# Patient Record
Sex: MALE | Race: WHITE | NOT HISPANIC OR LATINO | Employment: OTHER | URBAN - METROPOLITAN AREA
[De-identification: names, ages, dates, MRNs, and addresses within clinical notes are randomized per-mention and may not be internally consistent; named-entity substitution may affect disease eponyms.]

---

## 2018-02-26 ENCOUNTER — OFFICE VISIT CONVERTED (OUTPATIENT)
Dept: FAMILY MEDICINE CLINIC | Facility: CLINIC | Age: 68
End: 2018-02-26
Attending: NURSE PRACTITIONER

## 2018-06-13 ENCOUNTER — OFFICE VISIT CONVERTED (OUTPATIENT)
Dept: FAMILY MEDICINE CLINIC | Facility: CLINIC | Age: 68
End: 2018-06-13
Attending: NURSE PRACTITIONER

## 2018-09-12 ENCOUNTER — OFFICE VISIT CONVERTED (OUTPATIENT)
Dept: FAMILY MEDICINE CLINIC | Facility: CLINIC | Age: 68
End: 2018-09-12
Attending: NURSE PRACTITIONER

## 2018-09-12 ENCOUNTER — CONVERSION ENCOUNTER (OUTPATIENT)
Dept: FAMILY MEDICINE CLINIC | Facility: CLINIC | Age: 68
End: 2018-09-12

## 2019-03-11 ENCOUNTER — CONVERSION ENCOUNTER (OUTPATIENT)
Dept: FAMILY MEDICINE CLINIC | Facility: CLINIC | Age: 69
End: 2019-03-11

## 2019-03-11 ENCOUNTER — OFFICE VISIT CONVERTED (OUTPATIENT)
Dept: FAMILY MEDICINE CLINIC | Facility: CLINIC | Age: 69
End: 2019-03-11
Attending: NURSE PRACTITIONER

## 2019-03-21 ENCOUNTER — HOSPITAL ENCOUNTER (OUTPATIENT)
Dept: GENERAL RADIOLOGY | Facility: HOSPITAL | Age: 69
Discharge: HOME | End: 2019-03-21
Attending: NURSE PRACTITIONER

## 2019-09-18 ENCOUNTER — HOSPITAL ENCOUNTER (OUTPATIENT)
Dept: GENERAL RADIOLOGY | Facility: HOSPITAL | Age: 69
Discharge: HOME | End: 2019-09-18
Attending: NURSE PRACTITIONER

## 2019-09-18 ENCOUNTER — CONVERSION ENCOUNTER (OUTPATIENT)
Dept: FAMILY MEDICINE CLINIC | Facility: CLINIC | Age: 69
End: 2019-09-18

## 2019-09-18 ENCOUNTER — HOSPITAL ENCOUNTER (OUTPATIENT)
Dept: LAB | Facility: HOSPITAL | Age: 69
Discharge: HOME | End: 2019-09-18
Attending: NURSE PRACTITIONER

## 2019-09-18 ENCOUNTER — OFFICE VISIT CONVERTED (OUTPATIENT)
Dept: FAMILY MEDICINE CLINIC | Facility: CLINIC | Age: 69
End: 2019-09-18
Attending: NURSE PRACTITIONER

## 2019-09-18 LAB
ALBUMIN SERPL-MCNC: 4.5 G/DL (ref 3.5–5)
ALBUMIN/GLOB SERPL: 1.7 {RATIO} (ref 1.4–2.6)
ALP SERPL-CCNC: 56 U/L (ref 56–155)
ALT SERPL-CCNC: 14 U/L (ref 10–40)
ANION GAP SERPL CALC-SCNC: 26 MMOL/L (ref 8–19)
AST SERPL-CCNC: 17 U/L (ref 15–50)
BASOPHILS # BLD AUTO: 0.06 10*3/UL (ref 0–0.2)
BASOPHILS NFR BLD AUTO: 0.7 % (ref 0–3)
BILIRUB SERPL-MCNC: 0.18 MG/DL (ref 0.2–1.3)
BUN SERPL-MCNC: 18 MG/DL (ref 5–25)
BUN/CREAT SERPL: 13 {RATIO} (ref 6–20)
CALCIUM SERPL-MCNC: 10.3 MG/DL (ref 8.7–10.4)
CHLORIDE SERPL-SCNC: 101 MMOL/L (ref 99–111)
CHOLEST SERPL-MCNC: 136 MG/DL (ref 107–200)
CHOLEST/HDLC SERPL: 2.8 {RATIO} (ref 3–6)
CONV ABS IMM GRAN: 0.02 10*3/UL (ref 0–0.2)
CONV CO2: 23 MMOL/L (ref 22–32)
CONV IMMATURE GRAN: 0.2 % (ref 0–1.8)
CONV TOTAL PROTEIN: 7.1 G/DL (ref 6.3–8.2)
CREAT UR-MCNC: 1.43 MG/DL (ref 0.7–1.2)
CRP SERPL-MCNC: 1.6 MG/L (ref 0–5)
DEPRECATED RDW RBC AUTO: 44.8 FL (ref 35.1–43.9)
EOSINOPHIL # BLD AUTO: 0.41 10*3/UL (ref 0–0.7)
EOSINOPHIL # BLD AUTO: 5.1 % (ref 0–7)
ERYTHROCYTE [DISTWIDTH] IN BLOOD BY AUTOMATED COUNT: 12.5 % (ref 11.6–14.4)
ERYTHROCYTE [SEDIMENTATION RATE] IN BLOOD: 18 MM/H (ref 0–20)
GFR SERPLBLD BASED ON 1.73 SQ M-ARVRAT: 50 ML/MIN/{1.73_M2}
GLOBULIN UR ELPH-MCNC: 2.6 G/DL (ref 2–3.5)
GLUCOSE SERPL-MCNC: 90 MG/DL (ref 70–99)
HCT VFR BLD AUTO: 36.2 % (ref 42–52)
HDLC SERPL-MCNC: 48 MG/DL (ref 40–60)
HGB BLD-MCNC: 11.4 G/DL (ref 14–18)
LDLC SERPL CALC-MCNC: 62 MG/DL (ref 70–100)
LYMPHOCYTES # BLD AUTO: 2.35 10*3/UL (ref 1–5)
LYMPHOCYTES NFR BLD AUTO: 29.3 % (ref 20–45)
MCH RBC QN AUTO: 30.9 PG (ref 27–31)
MCHC RBC AUTO-ENTMCNC: 31.5 G/DL (ref 33–37)
MCV RBC AUTO: 98.1 FL (ref 80–96)
MONOCYTES # BLD AUTO: 0.6 10*3/UL (ref 0.2–1.2)
MONOCYTES NFR BLD AUTO: 7.5 % (ref 3–10)
NEUTROPHILS # BLD AUTO: 4.57 10*3/UL (ref 2–8)
NEUTROPHILS NFR BLD AUTO: 57.2 % (ref 30–85)
NRBC CBCN: 0 % (ref 0–0.7)
OSMOLALITY SERPL CALC.SUM OF ELEC: 301 MOSM/KG (ref 273–304)
PLATELET # BLD AUTO: 319 10*3/UL (ref 130–400)
PMV BLD AUTO: 10.2 FL (ref 9.4–12.4)
POTASSIUM SERPL-SCNC: 4.8 MMOL/L (ref 3.5–5.3)
PSA SERPL-MCNC: 1.15 NG/ML (ref 0–4)
RBC # BLD AUTO: 3.69 10*6/UL (ref 4.7–6.1)
SODIUM SERPL-SCNC: 145 MMOL/L (ref 135–147)
TRIGL SERPL-MCNC: 132 MG/DL (ref 40–150)
TSH SERPL-ACNC: 1.59 M[IU]/L (ref 0.27–4.2)
URATE SERPL-MCNC: 6 MG/DL (ref 3.5–8.5)
VLDLC SERPL-MCNC: 26 MG/DL (ref 5–37)
WBC # BLD AUTO: 8.01 10*3/UL (ref 4.8–10.8)

## 2020-03-18 ENCOUNTER — OFFICE VISIT CONVERTED (OUTPATIENT)
Dept: FAMILY MEDICINE CLINIC | Facility: CLINIC | Age: 70
End: 2020-03-18
Attending: NURSE PRACTITIONER

## 2020-03-18 ENCOUNTER — HOSPITAL ENCOUNTER (OUTPATIENT)
Dept: LAB | Facility: HOSPITAL | Age: 70
Discharge: HOME | End: 2020-03-18
Attending: NURSE PRACTITIONER

## 2020-03-18 LAB
ALBUMIN SERPL-MCNC: 4.5 G/DL (ref 3.5–5)
ALBUMIN/GLOB SERPL: 1.7 {RATIO} (ref 1.4–2.6)
ALP SERPL-CCNC: 61 U/L (ref 56–155)
ALT SERPL-CCNC: 16 U/L (ref 10–40)
ANION GAP SERPL CALC-SCNC: 18 MMOL/L (ref 8–19)
APPEARANCE UR: CLEAR
AST SERPL-CCNC: 19 U/L (ref 15–50)
BASOPHILS # BLD AUTO: 0.08 10*3/UL (ref 0–0.2)
BASOPHILS NFR BLD AUTO: 1.1 % (ref 0–3)
BILIRUB SERPL-MCNC: 0.16 MG/DL (ref 0.2–1.3)
BILIRUB UR QL: NEGATIVE
BUN SERPL-MCNC: 20 MG/DL (ref 5–25)
BUN/CREAT SERPL: 14 {RATIO} (ref 6–20)
CALCIUM SERPL-MCNC: 9.7 MG/DL (ref 8.7–10.4)
CHLORIDE SERPL-SCNC: 98 MMOL/L (ref 99–111)
CHOLEST SERPL-MCNC: 159 MG/DL (ref 107–200)
CHOLEST/HDLC SERPL: 2.8 {RATIO} (ref 3–6)
COLOR UR: YELLOW
CONV ABS IMM GRAN: 0.02 10*3/UL (ref 0–0.2)
CONV CO2: 25 MMOL/L (ref 22–32)
CONV COLLECTION SOURCE (UA): NORMAL
CONV IMMATURE GRAN: 0.3 % (ref 0–1.8)
CONV TOTAL PROTEIN: 7.2 G/DL (ref 6.3–8.2)
CONV UROBILINOGEN IN URINE BY AUTOMATED TEST STRIP: 0.2 {EHRLICHU}/DL (ref 0.1–1)
CREAT UR-MCNC: 1.48 MG/DL (ref 0.7–1.2)
DEPRECATED RDW RBC AUTO: 43.8 FL (ref 35.1–43.9)
EOSINOPHIL # BLD AUTO: 0.35 10*3/UL (ref 0–0.7)
EOSINOPHIL # BLD AUTO: 4.8 % (ref 0–7)
ERYTHROCYTE [DISTWIDTH] IN BLOOD BY AUTOMATED COUNT: 12.6 % (ref 11.6–14.4)
GFR SERPLBLD BASED ON 1.73 SQ M-ARVRAT: 48 ML/MIN/{1.73_M2}
GLOBULIN UR ELPH-MCNC: 2.7 G/DL (ref 2–3.5)
GLUCOSE SERPL-MCNC: 93 MG/DL (ref 70–99)
GLUCOSE UR QL: NEGATIVE MG/DL
HCT VFR BLD AUTO: 36.9 % (ref 42–52)
HDLC SERPL-MCNC: 56 MG/DL (ref 40–60)
HGB BLD-MCNC: 11.9 G/DL (ref 14–18)
HGB UR QL STRIP: NEGATIVE
KETONES UR QL STRIP: NEGATIVE MG/DL
LDLC SERPL CALC-MCNC: 81 MG/DL (ref 70–100)
LEUKOCYTE ESTERASE UR QL STRIP: NEGATIVE
LYMPHOCYTES # BLD AUTO: 2.32 10*3/UL (ref 1–5)
LYMPHOCYTES NFR BLD AUTO: 31.7 % (ref 20–45)
MCH RBC QN AUTO: 30.8 PG (ref 27–31)
MCHC RBC AUTO-ENTMCNC: 32.2 G/DL (ref 33–37)
MCV RBC AUTO: 95.6 FL (ref 80–96)
MONOCYTES # BLD AUTO: 0.78 10*3/UL (ref 0.2–1.2)
MONOCYTES NFR BLD AUTO: 10.7 % (ref 3–10)
NEUTROPHILS # BLD AUTO: 3.76 10*3/UL (ref 2–8)
NEUTROPHILS NFR BLD AUTO: 51.4 % (ref 30–85)
NITRITE UR QL STRIP: NEGATIVE
NRBC CBCN: 0 % (ref 0–0.7)
OSMOLALITY SERPL CALC.SUM OF ELEC: 284 MOSM/KG (ref 273–304)
PH UR STRIP.AUTO: 5 [PH] (ref 5–8)
PLATELET # BLD AUTO: 306 10*3/UL (ref 130–400)
PMV BLD AUTO: 10.1 FL (ref 9.4–12.4)
POTASSIUM SERPL-SCNC: 4.7 MMOL/L (ref 3.5–5.3)
PROT UR QL: NEGATIVE MG/DL
RBC # BLD AUTO: 3.86 10*6/UL (ref 4.7–6.1)
SODIUM SERPL-SCNC: 136 MMOL/L (ref 135–147)
SP GR UR: 1.02 (ref 1–1.03)
TRIGL SERPL-MCNC: 108 MG/DL (ref 40–150)
VLDLC SERPL-MCNC: 22 MG/DL (ref 5–37)
WBC # BLD AUTO: 7.31 10*3/UL (ref 4.8–10.8)

## 2020-06-22 ENCOUNTER — OFFICE VISIT CONVERTED (OUTPATIENT)
Dept: FAMILY MEDICINE CLINIC | Facility: CLINIC | Age: 70
End: 2020-06-22
Attending: NURSE PRACTITIONER

## 2020-09-23 ENCOUNTER — OFFICE VISIT CONVERTED (OUTPATIENT)
Dept: FAMILY MEDICINE CLINIC | Facility: CLINIC | Age: 70
End: 2020-09-23
Attending: NURSE PRACTITIONER

## 2020-09-23 ENCOUNTER — HOSPITAL ENCOUNTER (OUTPATIENT)
Dept: LAB | Facility: HOSPITAL | Age: 70
Discharge: HOME | End: 2020-09-23
Attending: NURSE PRACTITIONER

## 2020-09-23 ENCOUNTER — CONVERSION ENCOUNTER (OUTPATIENT)
Dept: FAMILY MEDICINE CLINIC | Facility: CLINIC | Age: 70
End: 2020-09-23

## 2020-09-23 LAB
ALBUMIN SERPL-MCNC: 4.2 G/DL (ref 3.5–5)
ALBUMIN/GLOB SERPL: 1.5 {RATIO} (ref 1.4–2.6)
ALP SERPL-CCNC: 57 U/L (ref 56–155)
ALT SERPL-CCNC: 20 U/L (ref 10–40)
ANION GAP SERPL CALC-SCNC: 19 MMOL/L (ref 8–19)
APPEARANCE UR: CLEAR
AST SERPL-CCNC: 23 U/L (ref 15–50)
BASOPHILS # BLD AUTO: 0.06 10*3/UL (ref 0–0.2)
BASOPHILS NFR BLD AUTO: 0.8 % (ref 0–3)
BILIRUB SERPL-MCNC: 0.21 MG/DL (ref 0.2–1.3)
BILIRUB UR QL: NEGATIVE
BUN SERPL-MCNC: 13 MG/DL (ref 5–25)
BUN/CREAT SERPL: 10 {RATIO} (ref 6–20)
CALCIUM SERPL-MCNC: 9.4 MG/DL (ref 8.7–10.4)
CHLORIDE SERPL-SCNC: 99 MMOL/L (ref 99–111)
CHOLEST SERPL-MCNC: 155 MG/DL (ref 107–200)
CHOLEST/HDLC SERPL: 3.2 {RATIO} (ref 3–6)
COLOR UR: YELLOW
CONV ABS IMM GRAN: 0.01 10*3/UL (ref 0–0.2)
CONV CO2: 27 MMOL/L (ref 22–32)
CONV COLLECTION SOURCE (UA): NORMAL
CONV IMMATURE GRAN: 0.1 % (ref 0–1.8)
CONV TOTAL PROTEIN: 7 G/DL (ref 6.3–8.2)
CONV UROBILINOGEN IN URINE BY AUTOMATED TEST STRIP: 0.2 {EHRLICHU}/DL (ref 0.1–1)
CREAT UR-MCNC: 1.25 MG/DL (ref 0.7–1.2)
DEPRECATED RDW RBC AUTO: 46.5 FL (ref 35.1–43.9)
EOSINOPHIL # BLD AUTO: 0.42 10*3/UL (ref 0–0.7)
EOSINOPHIL # BLD AUTO: 5.7 % (ref 0–7)
ERYTHROCYTE [DISTWIDTH] IN BLOOD BY AUTOMATED COUNT: 12.8 % (ref 11.6–14.4)
GFR SERPLBLD BASED ON 1.73 SQ M-ARVRAT: 58 ML/MIN/{1.73_M2}
GLOBULIN UR ELPH-MCNC: 2.8 G/DL (ref 2–3.5)
GLUCOSE SERPL-MCNC: 85 MG/DL (ref 70–99)
GLUCOSE UR QL: NEGATIVE MG/DL
HCT VFR BLD AUTO: 40.6 % (ref 42–52)
HDLC SERPL-MCNC: 48 MG/DL (ref 40–60)
HGB BLD-MCNC: 12.7 G/DL (ref 14–18)
HGB UR QL STRIP: NEGATIVE
KETONES UR QL STRIP: NEGATIVE MG/DL
LDLC SERPL CALC-MCNC: 77 MG/DL (ref 70–100)
LEUKOCYTE ESTERASE UR QL STRIP: NEGATIVE
LYMPHOCYTES # BLD AUTO: 2.13 10*3/UL (ref 1–5)
LYMPHOCYTES NFR BLD AUTO: 28.8 % (ref 20–45)
MCH RBC QN AUTO: 30.9 PG (ref 27–31)
MCHC RBC AUTO-ENTMCNC: 31.3 G/DL (ref 33–37)
MCV RBC AUTO: 98.8 FL (ref 80–96)
MONOCYTES # BLD AUTO: 0.61 10*3/UL (ref 0.2–1.2)
MONOCYTES NFR BLD AUTO: 8.2 % (ref 3–10)
NEUTROPHILS # BLD AUTO: 4.17 10*3/UL (ref 2–8)
NEUTROPHILS NFR BLD AUTO: 56.4 % (ref 30–85)
NITRITE UR QL STRIP: NEGATIVE
NRBC CBCN: 0 % (ref 0–0.7)
OSMOLALITY SERPL CALC.SUM OF ELEC: 291 MOSM/KG (ref 273–304)
PH UR STRIP.AUTO: 5.5 [PH] (ref 5–8)
PLATELET # BLD AUTO: 331 10*3/UL (ref 130–400)
PMV BLD AUTO: 10.3 FL (ref 9.4–12.4)
POTASSIUM SERPL-SCNC: 4.1 MMOL/L (ref 3.5–5.3)
PROT UR QL: NEGATIVE MG/DL
PSA SERPL-MCNC: 1.12 NG/ML (ref 0–4)
RBC # BLD AUTO: 4.11 10*6/UL (ref 4.7–6.1)
SODIUM SERPL-SCNC: 141 MMOL/L (ref 135–147)
SP GR UR: 1.01 (ref 1–1.03)
TRIGL SERPL-MCNC: 150 MG/DL (ref 40–150)
VLDLC SERPL-MCNC: 30 MG/DL (ref 5–37)
WBC # BLD AUTO: 7.4 10*3/UL (ref 4.8–10.8)

## 2020-10-07 ENCOUNTER — HOSPITAL ENCOUNTER (OUTPATIENT)
Dept: GENERAL RADIOLOGY | Facility: HOSPITAL | Age: 70
Discharge: HOME | End: 2020-10-07
Attending: NURSE PRACTITIONER

## 2021-01-06 ENCOUNTER — OFFICE VISIT CONVERTED (OUTPATIENT)
Dept: FAMILY MEDICINE CLINIC | Facility: CLINIC | Age: 71
End: 2021-01-06
Attending: NURSE PRACTITIONER

## 2021-04-07 ENCOUNTER — OFFICE VISIT CONVERTED (OUTPATIENT)
Dept: FAMILY MEDICINE CLINIC | Facility: CLINIC | Age: 71
End: 2021-04-07
Attending: NURSE PRACTITIONER

## 2021-05-13 NOTE — PROGRESS NOTES
Progress Note      Patient Name: Cristóbal Garcia   Patient ID: [id number]   Sex: Male   YOB: 1950    Primary Care Provider: Tim TAMAYO    Visit Date: September 23, 2020    Provider: RONI Mac   Location: [facility]   Location Address: [address] [place] [address], [address]  [id number]   Location Phone: [phone]          Chief Complaint  · Annual Wellness Exam      History Of Present Illness  The patient is a 69 year old /White male who has come to this office for his Annual Wellness Visit.   His Primary Care Provider is Tim TAMYAO. His comprehensive Care Team list, including suppliers, has been updated on the Facesheet. His medical/family history, height, weight, BMI, and blood pressure have been reviewed and are in the chart. The Health Risk Assessment has been completed and scanned in the chart.   Medications are listed in the medication list.   The active problem list includes: Arthritis, Forgetfulness, High blood pressure, High cholesterol, Lumbar radiculitis, Osteoarthritis, Reflux Disease, and Shoulder pain   The patient does not have a history of substance use.   Patient reports his diet is adequate.   The Mini-Cog has been administered and is scanned in chart. The results are negative. His cognitive function is without limitation.   A hearing loss screen was completed today and the result is negative.   Patient has the following risk factors for depression: currently has depression. Patient completed the PHQ-9 today and it has been scanned in the chart. The total score is 5-9.   The Timed Up and Go screen was administered today and the result is negative.   The José Index of [place] in ADLs indicated full function (score of 6).   A Falls Risk Assessment has been completed, including a review of home fall hazards and medication review.   Overall, the patient's functional ability is noted by this provider to be within normal  limits. His level of safety is noted to be within normal limits. His balance/gait is within normal limits. There have been no falls in the past year. Patient-specific home safety recommendations have been reviewed and a copy has been given to patient.   He denies issues with leaking urine.   There are no additional risk factors identified.   Living Will/Advanced Directive previously executed but not in chart.   Personalized health advice was given to the patient and a written health screening schedule was established; see Plan for details.   Cristóbal Garcia is a 69 year old /White male who presents for evaluation and treatment of:       Past Medical History  Disease Name Date Onset Notes   Alcoholism --  --    Anxiety --  --    Arthritis --  --    Cataracts, bilateral --  --    Forgetfulness --  --    Gastric reflux --  --    Hemorrhoids, unspecified hemorrhoid type --  --    High blood pressure --  --    High cholesterol --  --    Low back pain 06/11/2012 --    Lumbar radiculitis 06/11/2012 --    Night sweats --  --    Osteoarthritis 06/11/2012 --    Pain, Arm --  --    Pain, Cervical Spine --  --    Pain, Generalized --  --    Pain, Joint --  --    Pain, Leg --  --    Pain, Lumbar --  --    Pain, Other --  Legs toes and fingers   Reflux Disease --  --    Shoulder pain 06/11/2012 --          Past Surgical History  Procedure Name Date Notes   Appendectomy --  --    Cataract exctraction with lens implant --  --    Cholecystecomy --  --    Shoulder surgery --  --          Medication List  Name Date Started Instructions   aspirin 325 mg Oral Tablet  take 1 tablet (325 mg) by oral route once daily   atorvastatin 40 mg oral tablet 03/18/2020 TAKE 1 TABLET BY MOUTH ONCE DAILY for 30 days   carvedilol 12.5 mg oral tablet 03/18/2020 TAKE ONE TABLET BY MOUTH TWICE DAILY WITH FOOD FOR 30 DAYS for 30 days   Claritin 10 mg oral tablet 03/18/2020 take 1 tablet (10 mg) by oral route once daily for 30 days   cyanocobalamin  (vitamin B-12) 1,000 mcg/mL injection solution 07/03/2020 INJECT 1 ML INTO THE MUSCLE ONCE A MONTH   ferrous sulfate 325 mg (65 mg iron) oral tablet 03/18/2020 TAKE 1 TABLET BY MOUTH ONCE DAILY for 30 days   gabapentin 300 mg oral capsule 07/20/2020 TAKE ONE CAPSULE BY MOUTH THREE TIMES DAILY FOR 30 DAYS   Glucosamine Chondroitin 550-30-1 mg oral capsule 03/18/2020 take 1 capsule by oral route 2 times a day for 30 days   hydrochlorothiazide 12.5 mg oral capsule 03/18/2020 TAKE ONE CAPSULE BY MOUTH TWICE DAILY for 30 days   lactulose 10 gram/15 mL oral solution 03/18/2020 take 15 to 30ml by oral route QD for 30 days   Lasix 20 mg oral tablet 03/18/2020 take 1 tablet (20 mg) by oral route once daily for 30 days   NightTime Sleep Aid (diphen) 50 mg oral capsule  take 1-2 capsules by oral route daily for 30 days   nitroglycerin 0.4 mg sublingual tablet, sublingual 05/12/2017 place 1 tablet (0.4 mg) by buccal route at the first sign of an attack; no more than 3 tabs are recommended within a 15 minute period.   Osteo Bi-Flex 250-200 mg oral tablet 09/18/2019 take 1 tablet by oral route 2 times a day   Percocet  mg oral tablet  take 1 tablet by oral route every 6 hours as needed for 30 days   potassium gluconate 595 mg (99 mg) oral tablet extended release  take 1 tablet by oral route As needed for 30 days   Super B Complex + C 150 mg oral tablet  take 1 tablet by oral route daily for 30 days   Tylenol Arthritis Pain 650 mg oral tablet extended release 02/26/2018 take 1 tablet by oral route every 8 hours for 30 days   Xanax 1 mg oral tablet 09/23/2020 TAKE ONE TABLET BY MOUTH THREE TIMES A DAY - MAY CAUSE DROWSINESS         Allergy List  Allergen Name Date Reaction Notes   NO KNOWN DRUG ALLERGIES --  --  --          Family Medical History  Disease Name Relative/Age Notes   Stroke Father/   --    Heart Disease Brother/  Uncle/   --    Cancer, Unspecified Aunt/  Mother/80  Sister/  Uncle/   sister - breast cancer  "Mother- lung Uncle - sanjiv         Social History  Finding Status Start/Stop Quantity Notes   Alcohol Former --/-- --  quit in 1997   Tobacco Current every day 16/-- 2pdd --          Immunizations  NameDate Admin Mfg Trade Name Lot Number Route Inj VIS Given VIS Publication   Gqcpoqwfi14/23/2020 SKB Fluarix, quadrivalent, preservative free [id number] IM RD 09/23/2020    Comments: pt tolerated well   InfluenzaRefused 09/18/2019 NE Not Entered  NE NE     Comments:    Smkbcnrko84/03/2017 PMC Fluzone > 3 Years [id number] IM LD 10/03/2017 08/07/2015   Comments: pt tolerated injection well   Aorolsgmx33/19/2016 SKB Fluarix, quadrivalent, preservative free [id number] IM RD 10/19/2016 07/02/2012   Comments: pt tolerated inj well   Nsiehtucy3652/23/2020 MSD PNEUMOVAX 23 [id number] IM LD 09/23/2020    Comments: pt tolerated well   Prevnar 1310/21/2019 WAL PREVNAR 13 [id number] IM LD 10/21/2019    Comments: pt tolerated injection well   Prevnar 1310/21/2019 WAL PREVNAR 13 [id number] IM LD 10/21/2019    Comments: pt tolerated injection well   Tdap06/15/2016 SKB BOOSTRIX [id number] IM LD 06/15/2016 01/24/2012   Comments: pt tolerated inj well         Vitals  Date Time BP Position Site L\R Cuff Size HR RR TEMP (F) WT  HT  BMI kg/m2 BSA m2 O2 Sat HC       09/23/2020 02:06 /62 Sitting    85 - R 16 97.3 207lbs 0oz 5'  8\" 31.47 2.12 96 %              Assessment  · Encounter for Medicare annual wellness exam     V70.0/Z00.00  · Screening for depression     V79.0/Z13.89  · Screening for alcoholism     V79.1/Z13.39  · Nicotine dependence     305.1/F17.200  · Need for influenza vaccination     V04.81/Z23  · Need for pneumococcal vaccination     V03.82/Z23  · Screening for prostate cancer     V76.44/Z12.5  · Tobacco abuse counseling       Tobacco abuse counseling     V65.42/Z71.6  · Current smoker     305.1/F17.200      Plan  · Orders  o Falls Risk Assessment Completed (7690F) - V70.0/Z00.00 - 09/23/2020  o Brief hearing screening (written) [facility] () - " V70.0/Z00.00 - 09/23/2020  o Annual Wellness Visit-includes a Personalized Prevention Plan of Service (PPS), SUBSEQUENT VISIT (Medicare) () - V70.0/Z00.00 - 09/23/2020  o Presence or absence of urinary incontinence assessed (PAIGE) (1090F) - V70.0/Z00.00 - 09/23/2020  o Annual depression screening using the PHQ-9 tool, 15 minutes (62984, ) - V79.0/Z13.89 - 09/23/2020  o Annual alcohol screening using the AUDIT-C tool, 15 minutes [facility] (32442, ) - V79.1/Z13.39 - 09/23/2020  o Negative alcohol screening () - V79.1/Z13.39 - 09/23/2020  o Smoking cessation counseling, 3-10 minutes [facility] (14303) - 305.1/F17.200 - 09/23/2020  o ACO-17: Screened for tobacco use AND received tobacco cessation intervention (4004F) - 305.1/F17.200 - 09/23/2020  o Fluzone High Dose Flu Vaccine (77916) - V04.81/Z23 - 09/23/2020   Vaccine - Influenza; Dose: 1; Site: Right Deltoid; Route: Intramuscular; Date: 09/23/2020 14:52:00; Exp: 06/26/2015; Lot: [id number]; Mfg: [employer]; TradeName: Fluarix, quadrivalent, preservative free; Administered By: Tim Elaine; Comment: pt tolerated well  o Administration of Influenza Vaccine - Medicare () - V04.81/Z23 - 09/23/2020  o Pneumococcal Vaccine, 23 valent, adult (90418) - V03.82/Z23 - 09/23/2020   Vaccine - Bhnaqafht04; Dose: 1; Site: Left Deltoid; Route: Intramuscular; Date: 09/23/2020 14:48:00; Exp: 03/13/2022; Lot: [id number]; Mfg: [employer] TradeName: PNEUMOVAX 23; Administered By: Elaine, Tim APRN; Comment: pt tolerated well  o Administration of Pneumococcal Vaccine - Medicare () - V03.82/Z23 - 09/23/2020  o PSA Screening, Ultrasensitive, MEDICARE [facility] () - V76.44/Z12.5 - 09/23/2020  o Smoking cessation counseling, 3-10 minutes [facility] (67925) - V65.42/Z71.6 - 09/23/2020  o ACO-17: Screened for tobacco use AND received tobacco cessation intervention (4004F) - V65.42/Z71.6 - 09/23/2020  o Low dose CT scan (LDCT) for lung cancer screening [facility] () -  V65.42/Z71.6, 305.1/F17.200 - 09/23/2020  o ACO-39: Current medications updated and reviewed () - - 09/23/2020  o ACO-19: Colorectal cancer screening results documented and reviewed (3017F) - - 09/23/2020  o Influenza immunization was ordered or administered () - - 09/23/2020  o ACO-15: Pneumococcal Vaccine Administered or Previously Received (4040F) - - 09/23/2020  o ACO - Pt declines to or was not able to provide an Advance Care Plan or name a Surrogate Decision Maker (1124F) - - 09/23/2020  o DARRIUS Report (KASPR) - - 09/23/2020  o ACO-18: Positive screen for clinical depression using a standardized tool and a follow-up plan documented () - - 09/23/2020  o ACO-13: Fall Risk Screening with no falls in past year or only one fall without injury in the past year (1101F) - - 09/23/2020  · Medications  o Medications have been Reconciled  o Transition of Care or Provider Policy  · Instructions  o Health Risk Assessment has been reviewed with the patient.  o Written health screening schedule for next 5-10 years was established with patient; information scanned in chart and given/mailed to patient.  o Fall prevention methods discussed and a copy of recommendations given/mailed to patient.  o Today's PHQ-9 score is: 8  o Positive depression screen. Today's Plan: Pt does have stressors at home. his ex wife is living with him. he is taking Xanax TID and it helps. some of his answers are r/t his health/sleep  o Depression Screen completed and scanned into the EMR under the designated folder within the patient's documents.  o Discussed benefits of smoking cessation and given suggestions on how to stop smoking. Today's discussion lasted 3-10 minutes.   o *Form of nicotine being used:   o Patient was strongly encouraged to discontinue use of any nicotine containing product or minimize the use of the product.  o Tobacco and smoking cessation counseling for more than 3 minutes was completed.  o Patient was  educated/instructed on their diagnosis, treatment and medications prior to discharge from the clinic today.  o Minutes spent with patient including greater than 50% in Education/Counseling/Care Coordination.  o Time spent with the patient was minutes, more than 50% face to face.  · Disposition  o Return in 3 months for F/U            Electronically Signed by: RONI Mac -Author on September 23, 2020 04:07:22 PM

## 2021-05-13 NOTE — PROGRESS NOTES
"   Progress Note      Patient Name: Cristóbal Garcia   Patient ID: [id number]   Sex: Male   YOB: 1950    Primary Care Provider: Tim TAMAYO    Visit Date: June 22, 2020    Provider: RONI Mac   Location: [facility]   Location Address: [address] [place] [address], [address] [place] [address]  [id number]   Location Phone: [phone]          Chief Complaint     The patient is here for a three month f/u of anemia, allergies, hyperlipidemia, htn, anxiety. He is supposed to get a colonoscopy and eye surgery but doesn't feel up to it now.       History Of Present Illness  Cristóbal Garcia is a 69 year old /White male who presents for evaluation and treatment of:      follow up:    allergies-  doing well on medications    anxiety- managed with xanax.  Patient states he has too many doctor's appointments and \"too much going on\" with his family and him.  Stays in constant pain.  But manages as well as can with other issues going on.,    hyperlipidemia- doing well on medications.    hypertension- patient doesn't check his blood pressure regularly at home.  He states when he does, it runs 140s/80s.      Pain- Patient states he hurts all over.  Sees pain management.    Patient also states that he needs to have a cataract removed but he does not feel up to it at this point in time.       Past Medical History  Disease Name Date Onset Notes   Alcoholism --  --    Anxiety --  --    Arthritis --  --    Cataracts, bilateral --  --    Forgetfulness --  --    Gastric reflux --  --    Hemorrhoids, unspecified hemorrhoid type --  --    High blood pressure --  --    High cholesterol --  --    Low back pain 06/11/2012 --    Lumbar radiculitis 06/11/2012 --    Night sweats --  --    Osteoarthritis 06/11/2012 --    Pain, Arm --  --    Pain, Cervical Spine --  --    Pain, Generalized --  --    Pain, Joint --  --    Pain, Leg --  --    Pain, Lumbar --  --    Pain, Other --  Legs toes and fingers   Reflux Disease --  " --    Shoulder pain 06/11/2012 --          Past Surgical History  Procedure Name Date Notes   Appendectomy --  --    Cataract exctraction with lens implant --  --    Cholecystecomy --  --    Shoulder surgery --  --          Medication List  Name Date Started Instructions   aspirin 325 mg Oral Tablet  take 1 tablet (325 mg) by oral route once daily   atorvastatin 40 mg oral tablet 03/18/2020 TAKE 1 TABLET BY MOUTH ONCE DAILY for 30 days   carvedilol 12.5 mg oral tablet 03/18/2020 TAKE ONE TABLET BY MOUTH TWICE DAILY WITH FOOD FOR 30 DAYS for 30 days   Claritin 10 mg oral tablet 03/18/2020 take 1 tablet (10 mg) by oral route once daily for 30 days   cyanocobalamin (vitamin B-12) 1,000 mcg/mL injection solution 03/25/2020 INJECT 1 ML INTO THE MUSCLE ONCE A MONTH   ferrous sulfate 325 mg (65 mg iron) oral tablet 03/18/2020 TAKE 1 TABLET BY MOUTH ONCE DAILY for 30 days   gabapentin 300 mg oral capsule 04/13/2020 TAKE ONE CAPSULE BY MOUTH THREE TIMES DAILY FOR 30 DAYS   Glucosamine Chondroitin 550-30-1 mg oral capsule 03/18/2020 take 1 capsule by oral route 2 times a day for 30 days   hydrochlorothiazide 12.5 mg oral capsule 03/18/2020 TAKE ONE CAPSULE BY MOUTH TWICE DAILY for 30 days   lactulose 10 gram/15 mL oral solution 03/18/2020 take 15 to 30ml by oral route QD for 30 days   Lasix 20 mg oral tablet 03/18/2020 take 1 tablet (20 mg) by oral route once daily for 30 days   NightTime Sleep Aid (diphen) 50 mg oral capsule  take 1-2 capsules by oral route daily for 30 days   nitroglycerin 0.4 mg sublingual tablet, sublingual 05/12/2017 place 1 tablet (0.4 mg) by buccal route at the first sign of an attack; no more than 3 tabs are recommended within a 15 minute period.   Osteo Bi-Flex 250-200 mg oral tablet 09/18/2019 take 1 tablet by oral route 2 times a day   Percocet  mg oral tablet  take 1 tablet by oral route every 6 hours as needed for 30 days   potassium gluconate 595 mg (99 mg) oral tablet extended release   take 1 tablet by oral route As needed for 30 days   Super B Complex + C 150 mg oral tablet  take 1 tablet by oral route daily for 30 days   Tylenol Arthritis Pain 650 mg oral tablet extended release 02/26/2018 take 1 tablet by oral route every 8 hours for 30 days   Xanax 1 mg oral tablet 06/03/2020 TAKE ONE TABLET BY MOUTH THREE TIMES A DAY - MAY CAUSE DROWSINESS for30 days         Allergy List  Allergen Name Date Reaction Notes   NO KNOWN DRUG ALLERGIES --  --  --          Family Medical History  Disease Name Relative/Age Notes   Stroke Father/   --    Heart Disease Brother/  Uncle/   --    Cancer, Unspecified Aunt/  Mother/80  Sister/  Uncle/   sister - breast cancer Mother- lung Uncle - lukemia         Social History  Finding Status Start/Stop Quantity Notes   Alcohol Former --/-- --  quit in 1997   Tobacco Current every day 16/-- 2pdd --          Immunizations  NameDate Admin Mfg Trade Name Lot Number Route Inj VIS Given VIS Publication   InfluenzaRefused 09/18/2019 NE Not Entered  NE NE     Comments:    Ngmzsjnkj05/03/2017 PMC Fluzone > 3 Years [id number] IM LD 10/03/2017 08/07/2015   Comments: pt tolerated injection well   Waoashkpy56/19/2016 SKB Fluarix, quadrivalent, preservative free [id number] IM RD 10/19/2016 07/02/2012   Comments: pt tolerated inj well   Prevnar 1310/21/2019 WAL PREVNAR 13 [id number] IM LD 10/21/2019    Comments: pt tolerated injection well   Prevnar 1310/21/2019 WAL PREVNAR 13 [id number] IM LD 10/21/2019    Comments: pt tolerated injection well   Tdap06/15/2016 SKB BOOSTRIX [id number] IM LD 06/15/2016 01/24/2012   Comments: pt tolerated inj well         Review of Systems  · Constitutional  o Denies  o : fever, fatigue, weight loss, weight gain  · Cardiovascular  o Denies  o : lower extremity edema, claudication, chest pressure, palpitations  · Respiratory  o Denies  o : shortness of breath, wheezing, cough, hemoptysis, dyspnea on exertion  · Gastrointestinal  o Denies  o : nausea, vomiting, diarrhea,  "constipation, abdominal pain  · Musculoskeletal  o Admits  o : joint pain, muscle pain      Vitals  Date Time BP Position Site L\R Cuff Size HR RR TEMP (F) WT  HT  BMI kg/m2 BSA m2 O2 Sat HC       06/22/2020 02:05 /78 Sitting    72 - R 16 97.9 208lbs 0oz 5'  8\" 31.63 2.13 93 %          Physical Examination  · Constitutional  o Appearance  o : well-nourished, well developed, alert, in no acute distress  · Eyes  o Conjunctivae  o : conjunctivae normal  o Sclerae  o : sclerae white  o Pupils and Irises  o : pupils equal, round, and reactive to light and accommodation bilaterally  o Corneas  o : tear film normal, no lesions present  o Eyelids/Ocular Adnexae  o : eyelid appearance normal, no exudates present, eye moisture level normal  · Neck  o Inspection/Palpation  o : normal appearance, no masses or tenderness, trachea midline, no enlarged cervical or supraclavicular lymphnodes palpated  · Respiratory  o Respiratory Effort  o : breathing unlabored, no accessory muscle use  o Inspection of Chest  o : normal appearance, no retractions  o Auscultation of Lungs  o : right upper lobe rhonchi, diminished breath sounds throughout  · Cardiovascular  o Heart  o :   § Auscultation of Heart  § : regular rate and rhythm without murmur, PMI normal  o Peripheral Vascular System  o :   § Carotid Arteries  § : normal pulses bilaterally, no bruits present  § Extremities  § : no cyanosis, clubbing or edema; less than 2 second refill noted  · Musculoskeletal  o General  o : No joint swelling or deformity noted. Muscle tone, strength and development grossly normal.  · Skin and Subcutaneous Tissue  o General Inspection  o : no rashes or lesions present, no areas of discoloration  · Neurologic  o Mental Status Examination  o : judgement, insight intact, modd and affect appropriate  o Motor Examination  o : strength grossly intact in all four extremities  o Gait and Station  o : normal gait, able to stand without " difficulty          Assessment  · Anxiety disorder     300.00/F41.9  · Essential hypertension     401.9/I10  · Hyperlipidemia     272.4/E78.5  · Pain     780.96/R52      Plan  · Orders  o ACO-39: Current medications updated and reviewed () - - 06/22/2020  o ACO-14: Influenza immunization administered or previously received () - - 06/22/2020  · Medications  o nicotine 21 mg/24 hr transdermal patch 24 hour   SIG: apply 1 patch (21 mg) by transdermal route once daily for 4 weeks   DISP: (28) patches with 0 refills  Discontinued on 06/22/2020     o Medications have been Reconciled  o Transition of Care or Provider Policy  · Instructions  o Patient advised to monitor blood pressure (B/P) at home and journal readings. Patient informed that a B/P reading at home of more than 130/80 is considered hypertension. For readings greater xlcw882/90 or higher patient is advised to follow up in the office with readings for management. Patient advised to limit sodium intake.  o Advised that cheeses and other sources of dairy fats, animal fats, fast food, and the extras (candy, pastries, pies, doughnuts and cookies) all contain LDL raising nutrients. Advised to increase fruits, vegetables, whole grains, and to monitor portion sizes.   o Patient was educated/instructed on their diagnosis, treatment and medications prior to discharge from the clinic today.  o Minutes spent with patient including greater than 50% in Education/Counseling/Care Coordination.  o Time spent with the patient was minutes, more than 50% face to face.  · Disposition  o Return in 3 months for F/U            Electronically Signed by: RONI Mac -Author on June 22, 2020 03:13:06 PM

## 2021-05-13 NOTE — PROGRESS NOTES
"   Progress Note      Patient Name: Cristóbal Garcia   Patient ID: [id number]   Sex: Male   YOB: 1950    Primary Care Provider: Tim TAMAYO    Visit Date: September 23, 2020    Provider: RONI Mac   [place] [facility] [place]   Location Address: [address] [place] [address], [address]  [place], [place]  [id number]   Location Phone: [phone]          Chief Complaint     The patient is here for a three month f/u of neuropathy, anemia, hyperlipidemia, htn, allergies, anxiety.       History Of Present Illness  Cristóbal Garcia is a 69 year old /White male who presents for evaluation and treatment of:      HTN: Well controlled, 132/62 today in clinic. doing well with meds, does not check at home    anxiety: he states that his ex-wife has a lot of problems and she is living with him. this does cause him some stress. he does take Xanax 1mg TID    pain: goes to pain mgmt., doing \"alright\", states he hurts all over    hyperlipidemia: controlled with meds    current tobacco smoker: 2 PPD for 50 years, needs low dose CT scan       Past Medical History  Disease Name Date Onset Notes   Alcoholism --  --    Anxiety --  --    Arthritis --  --    Cataracts, bilateral --  --    Forgetfulness --  --    Gastric reflux --  --    Hemorrhoids, unspecified hemorrhoid type --  --    High blood pressure --  --    High cholesterol --  --    Low back pain 06/11/2012 --    Lumbar radiculitis 06/11/2012 --    Night sweats --  --    Osteoarthritis 06/11/2012 --    Pain, Arm --  --    Pain, Cervical Spine --  --    Pain, Generalized --  --    Pain, Joint --  --    Pain, Leg --  --    Pain, Lumbar --  --    Pain, Other --  Legs toes and fingers   Reflux Disease --  --    Shoulder pain 06/11/2012 --          Past Surgical History  Procedure Name Date Notes   Appendectomy --  --    Cataract exctraction with lens implant --  --    Cholecystecomy --  --    Shoulder surgery --  --          Medication List  Name " Date Started Instructions   aspirin 325 mg Oral Tablet  take 1 tablet (325 mg) by oral route once daily   atorvastatin 40 mg oral tablet 03/18/2020 TAKE 1 TABLET BY MOUTH ONCE DAILY for 30 days   carvedilol 12.5 mg oral tablet 03/18/2020 TAKE ONE TABLET BY MOUTH TWICE DAILY WITH FOOD FOR 30 DAYS for 30 days   Claritin 10 mg oral tablet 03/18/2020 take 1 tablet (10 mg) by oral route once daily for 30 days   cyanocobalamin (vitamin B-12) 1,000 mcg/mL injection solution 07/03/2020 INJECT 1 ML INTO THE MUSCLE ONCE A MONTH   ferrous sulfate 325 mg (65 mg iron) oral tablet 03/18/2020 TAKE 1 TABLET BY MOUTH ONCE DAILY for 30 days   gabapentin 300 mg oral capsule 07/20/2020 TAKE ONE CAPSULE BY MOUTH THREE TIMES DAILY FOR 30 DAYS   Glucosamine Chondroitin 550-30-1 mg oral capsule 03/18/2020 take 1 capsule by oral route 2 times a day for 30 days   hydrochlorothiazide 12.5 mg oral capsule 03/18/2020 TAKE ONE CAPSULE BY MOUTH TWICE DAILY for 30 days   lactulose 10 gram/15 mL oral solution 03/18/2020 take 15 to 30ml by oral route QD for 30 days   Lasix 20 mg oral tablet 03/18/2020 take 1 tablet (20 mg) by oral route once daily for 30 days   NightTime Sleep Aid (diphen) 50 mg oral capsule  take 1-2 capsules by oral route daily for 30 days   nitroglycerin 0.4 mg sublingual tablet, sublingual 05/12/2017 place 1 tablet (0.4 mg) by buccal route at the first sign of an attack; no more than 3 tabs are recommended within a 15 minute period.   Osteo Bi-Flex 250-200 mg oral tablet 09/18/2019 take 1 tablet by oral route 2 times a day   Percocet  mg oral tablet  take 1 tablet by oral route every 6 hours as needed for 30 days   potassium gluconate 595 mg (99 mg) oral tablet extended release  take 1 tablet by oral route As needed for 30 days   Super B Complex + C 150 mg oral tablet  take 1 tablet by oral route daily for 30 days   Tylenol Arthritis Pain 650 mg oral tablet extended release 02/26/2018 take 1 tablet by oral route every 8  hours for 30 days   Xanax 1 mg oral tablet 08/26/2020 TAKE ONE TABLET BY MOUTH THREE TIMES A DAY - MAY CAUSE DROWSINESS         Allergy List  Allergen Name Date Reaction Notes   NO KNOWN DRUG ALLERGIES --  --  --          Family Medical History  Disease Name Relative/Age Notes   Stroke Father/   --    Heart Disease Brother/  Uncle/   --    Cancer, Unspecified Aunt/  Mother/80  Sister/  Uncle/   sister - breast cancer Mother- lung Uncle - lukemia         Social History  Finding Status Start/Stop Quantity Notes   Alcohol Former --/-- --  quit in 1997   Tobacco Current every day 16/-- 2pdd --          Immunizations  NameDate Admin Mfg Trade Name Lot Number Route Inj VIS Given VIS Publication   Ccdncsrei12/23/2020 SKB Fluarix, quadrivalent, preservative free [id number] IM RD 09/23/2020    Comments: pt tolerated well   InfluenzaRefused 09/18/2019 NE Not Entered  NE NE     Comments:    Npzibrrhy28/03/2017 PMC Fluzone > 3 Years [id number] IM LD 10/03/2017 08/07/2015   Comments: pt tolerated injection well   Zistmulyo78/19/2016 SKB Fluarix, quadrivalent, preservative free [id number] IM RD 10/19/2016 07/02/2012   Comments: pt tolerated inj well   Znffawwgf4397/23/2020 MSD PNEUMOVAX 23 [id number] IM LD 09/23/2020    Comments: pt tolerated well   Prevnar 1310/21/2019 WAL PREVNAR 13 [id number] IM LD 10/21/2019    Comments: pt tolerated injection well   Prevnar 1310/21/2019 WAL PREVNAR 13 [id number] IM LD 10/21/2019    Comments: pt tolerated injection well   Tdap06/15/2016 SKB BOOSTRIX [id number] IM LD 06/15/2016 01/24/2012   Comments: pt tolerated inj well         Review of Systems  · Constitutional  o Denies  o : fever, fatigue, weight loss, weight gain  · Cardiovascular  o Denies  o : lower extremity edema, claudication, chest pressure, palpitations  · Respiratory  o Denies  o : shortness of breath, wheezing, cough, hemoptysis, dyspnea on exertion  · Gastrointestinal  o Denies  o : nausea, vomiting, diarrhea, constipation, abdominal  "pain      Vitals  Date Time BP Position Site L\R Cuff Size HR RR TEMP (F) WT  HT  BMI kg/m2 BSA m2 O2 Sat HC       09/23/2020 02:06 /62 Sitting    85 - R 16 97.3 207lbs 0oz 5'  8\" 31.47 2.12 96 %          Physical Examination  · Constitutional  o Appearance  o : well-nourished, well developed, alert, in no acute distress  · Eyes  o Conjunctivae  o : conjunctivae normal  o Sclerae  o : sclerae white  o Corneas  o : tear film normal, no lesions present  o Eyelids/Ocular Adnexae  o : eyelid appearance normal, no exudates present, eye moisture level normal  · Neck  o Inspection/Palpation  o : normal appearance, no masses or tenderness, trachea midline, no enlarged cervical or supraclavicular lymphnodes palpated  o Thyroid  o : gland size normal, nontender, no nodules or masses present on palpation, thyroid motion normal during swallowing  · Respiratory  o Respiratory Effort  o : breathing unlabored  o Inspection of Chest  o : normal appearance, no retractions  o Auscultation of Lungs  o : normal breath sounds throughout  · Cardiovascular  o Heart  o :   § Auscultation of Heart  § : regular rate and rhythm without murmur, PMI normal  o Peripheral Vascular System  o :   § Pedal Pulses  § : pulses 2 bilaterally  § Extremities  § : no cyanosis, clubbing or edema; less than 2 second refill noted  · Musculoskeletal  o General  o : No joint swelling or deformity noted. Muscle tone, strength and development grossly normal.  · Skin and Subcutaneous Tissue  o General Inspection  o : no rashes or lesions present, no areas of discoloration  · Neurologic  o Mental Status Examination  o : judgement, insight intact, modd and affect appropriate  o Motor Examination  o : strength grossly intact in all four extremities  o Gait and Station  o : normal gait, able to stand without difficulty          Assessment  · Anxiety disorder     300.00/F41.9  · Essential " hypertension     401.9/I10  · Hyperlipidemia     272.4/E78.5  · Polyarthralgia     719.49/M25.50  · Tobacco abuse counseling       Tobacco abuse counseling     V65.42/Z71.6      Plan  · Orders  o HTN/Lipid Panel (CMP, Lipid) [facility] (68658, 14367) - 401.9/I10 - 09/23/2020  o CBC with Auto Diff [facility] (08984) - 401.9/I10 - 09/23/2020  o Urinalysis with Reflex Microscopy if abnormal ([facility]) (61181) - 401.9/I10 - 09/23/2020  o Smoking cessation counseling, 3-10 minutes [facility] (82294) - V65.42/Z71.6 - 09/23/2020  o ACO-17: Screened for tobacco use AND received tobacco cessation intervention (4004F) - V65.42/Z71.6 - 09/23/2020  o DARRIUS Report (KASPR) - - 09/23/2020  o ACO-39: Current medications updated and reviewed () - - 09/23/2020  o ACO-14: Influenza immunization administered or previously received () - - 09/23/2020  · Medications  o Xanax 1 mg oral tablet   SIG: TAKE ONE TABLET BY MOUTH THREE TIMES A DAY - MAY CAUSE DROWSINESS   DISP: (90) Tablet with 0 refills  Refilled on 09/23/2020     o Medications have been Reconciled  o Transition of Care or Provider Policy  · Instructions  o Advised that cheeses and other sources of dairy fats, animal fats, fast food, and the extras (candy, pastries, pies, doughnuts and cookies) all contain LDL raising nutrients. Advised to increase fruits, vegetables, whole grains, and to monitor portion sizes.   o Tobacco and smoking cessation counseling for more than 3 minutes was completed.  o Patient was educated/instructed on their diagnosis, treatment and medications prior to discharge from the clinic today.  o Minutes spent with patient including greater than 50% in Education/Counseling/Care Coordination.  o Time spent with the patient was minutes, more than 50% face to face.  · Disposition  o Return in 3 months for F/U            Electronically Signed by: RONI Mac -Author on September 23, 2020 03:42:53 PM

## 2021-05-14 VITALS
SYSTOLIC BLOOD PRESSURE: 132 MMHG | OXYGEN SATURATION: 96 % | DIASTOLIC BLOOD PRESSURE: 62 MMHG | HEART RATE: 85 BPM | HEIGHT: 68 IN | WEIGHT: 207 LBS | TEMPERATURE: 97.3 F | RESPIRATION RATE: 16 BRPM | BODY MASS INDEX: 31.37 KG/M2

## 2021-05-14 VITALS
OXYGEN SATURATION: 97 % | DIASTOLIC BLOOD PRESSURE: 74 MMHG | WEIGHT: 209 LBS | TEMPERATURE: 97.2 F | BODY MASS INDEX: 31.67 KG/M2 | RESPIRATION RATE: 16 BRPM | HEART RATE: 78 BPM | HEIGHT: 68 IN | SYSTOLIC BLOOD PRESSURE: 154 MMHG

## 2021-05-14 VITALS
RESPIRATION RATE: 16 BRPM | TEMPERATURE: 97.5 F | DIASTOLIC BLOOD PRESSURE: 64 MMHG | WEIGHT: 205 LBS | SYSTOLIC BLOOD PRESSURE: 130 MMHG | BODY MASS INDEX: 31.07 KG/M2 | HEART RATE: 80 BPM | OXYGEN SATURATION: 93 % | HEIGHT: 68 IN

## 2021-05-14 NOTE — PROGRESS NOTES
Progress Note      Patient Name: Cristóbal Garcia   Patient ID: [id number]   Sex: Male   YOB: 1950    Primary Care Provider: Tim TAMAYO    Visit Date: April 7, 2021    Provider: RONI Mac   Location: [facility] [place]   Location Address: [address] [place] [address], [address]  [place], [place]  [id number]   Location Phone: [phone]          Chief Complaint     The patient is here for a three month f/u of htn, anemia, allergies, hyperlipidemia, anxiety, neuropathy.       History Of Present Illness  Cristóbal Garcia is a 70 year old /White male who presents for evaluation and treatment of:      Neuropathy:  doing okay with gabapentin.  Only taking 1-2 times a day instead of 3 times.  Has noticed more pins and needle sensations so will start 3 times a day and see if helps.    Anemia:  doing well and well controlled on medication.    B12 def: d oign well with and continues with b12 shots.    Hypertension:  well controlled 130/64.    Anxiety:  doing well on Xanax.    Hyperlipidemia:  not taking medication but occasionally.  Triglycerides 371.  Will start taking medication daily.    Allergies:  doing well on medication.       Past Medical History  Disease Name Date Onset Notes   Alcoholism --  --    Anxiety --  --    Arthritis --  --    Cataracts, bilateral --  --    Forgetfulness --  --    Gastric reflux --  --    Hemorrhoids, unspecified hemorrhoid type --  --    High blood pressure --  --    High cholesterol --  --    Low back pain 06/11/2012 --    Lumbar radiculitis 06/11/2012 --    Night sweats --  --    Osteoarthritis 06/11/2012 --    Pain, Arm --  --    Pain, Cervical Spine --  --    Pain, Generalized --  --    Pain, Joint --  --    Pain, Leg --  --    Pain, Lumbar --  --    Pain, Other --  Legs toes and fingers   Reflux Disease --  --    Shoulder pain 06/11/2012 --          Past Surgical History  Procedure Name Date Notes   Appendectomy --  --    Cataract exctraction  with lens implant --  --    Cholecystecomy --  --    Shoulder surgery --  --          Medication List  Name Date Started Instructions   aspirin 325 mg Oral Tablet  take 1 tablet (325 mg) by oral route once daily   atorvastatin 40 mg oral tablet 09/28/2020 TAKE 1 TABLET BY MOUTH ONCE DAILY for 30 days   CARVEDILOL 12.5 MG TABLET 12.5 Tablet 12/21/2020 TAKE ONE TABLET BY MOUTH TWO TIMES A DAY WITH FOOD   Claritin 10 mg oral tablet 03/18/2020 take 1 tablet (10 mg) by oral route once daily for 30 days   CYANOCOBALAMIN 1000 MCG/ML 1000 Solution 12/21/2020 INJECT 1 ML INTO THE MUSCLE ONCE A MONTH   ferrous sulfate 325 mg (65 mg iron) oral tablet 09/28/2020 TAKE 1 TABLET BY MOUTH ONCE DAILY for 30 days   FUROSEMIDE 20 MG TABLET 20 Tablet 03/12/2021 TAKE ONE TABLET BY MOUTH EVERY DAY   gabapentin 300 mg oral capsule 10/26/2020 TAKE ONE CAPSULE BY MOUTH THREE TIMES DAILY FOR 30 DAYS   Glucosamine Chondroitin 550-30-1 mg oral capsule 03/18/2020 take 1 capsule by oral route 2 times a day for 30 days   HYDROCHLOROTHIAZIDE 12.5 MG 12.5 Capsule 12/21/2020 TAKE ONE CAPSULE BY MOUTH TWO TIMES A DAY   LACTULOSE 10 GM/15 ML SOLN 10 Solution 04/05/2021 TAKE 15 TO 30 ML BY MOUTH ONCE DAILY   NightTime Sleep Aid (diphen) 50 mg oral capsule  take 1-2 capsules by oral route daily for 30 days   nitroglycerin 0.4 mg sublingual tablet, sublingual 05/12/2017 place 1 tablet (0.4 mg) by buccal route at the first sign of an attack; no more than 3 tabs are recommended within a 15 minute period.   Osteo Bi-Flex 250-200 mg oral tablet 09/18/2019 take 1 tablet by oral route 2 times a day   Percocet  mg oral tablet  take 1 tablet by oral route every 6 hours as needed for 30 days   potassium gluconate 595 mg (99 mg) oral tablet extended release  take 1 tablet by oral route As needed for 30 days   Super B Complex + C 150 mg oral tablet  take 1 tablet by oral route daily for 30 days   Tylenol Arthritis Pain 650 mg oral tablet extended release  "02/26/2018 take 1 tablet by oral route every 8 hours for 30 days   Xanax 1 mg oral tablet 03/12/2021 TAKE ONE TABLET BY MOUTH THREE TIMES A DAY - MAY CAUSE DROWSINESS         Allergy List  Allergen Name Date Reaction Notes   NO KNOWN DRUG ALLERGIES --  --  --          Family Medical History  Disease Name Relative/Age Notes   Stroke Father/   --    Heart Disease Brother/  Uncle/   --    Cancer, Unspecified Aunt/  Mother/80  Sister/  Uncle/   sister - breast cancer Mother- lung Uncle - lukemia         Social History  Finding Status Start/Stop Quantity Notes   Alcohol Former --/-- --  quit in 1997   Tobacco Current every day 16/-- 2pdd --          Immunizations  NameDate Admin Mfg Trade Name Lot Number Route Inj VIS Given VIS Publication   Wawfawqen77/23/2020 SKB Fluarix, quadrivalent, preservative free [id number] IM RD 09/23/2020    Comments: pt tolerated well   Cjbuhrexi0480/23/2020 MSD PNEUMOVAX 23 [id number] IM LD 09/23/2020    Comments: pt tolerated well   Prevnar 1310/21/2019 WAL PREVNAR 13 [id number] IM LD 10/21/2019    Comments: pt tolerated injection well   Tdap06/15/2016 SKB BOOSTRIX [id number] IM LD 06/15/2016 01/24/2012   Comments: pt tolerated inj well         Review of Systems  · Constitutional  o Denies  o : fever, fatigue, weight loss, weight gain  · Cardiovascular  o Denies  o : lower extremity edema, claudication, chest pressure, palpitations  · Respiratory  o Denies  o : shortness of breath, wheezing, cough, hemoptysis, dyspnea on exertion  · Gastrointestinal  o Denies  o : nausea, vomiting, diarrhea, constipation, abdominal pain      Vitals  Date Time BP Position Site L\R Cuff Size HR RR TEMP (F) WT  HT  BMI kg/m2 BSA m2 O2 Sat FR L/min FiO2        04/07/2021 10:51 /64 Sitting    80 - R 16 97.5 204lbs 16oz 5'  8\" 31.17 2.11 93 %  21%          Physical Examination  · Constitutional  o Appearance  o : well-nourished, well developed, alert, in no acute distress  · Eyes  o Conjunctivae  o : conjunctivae " normal  o Sclerae  o : sclerae white  o Pupils and Irises  o : pupils equal, round, and reactive to light and accommodation bilaterally  o Corneas  o : tear film normal, no lesions present  o Eyelids/Ocular Adnexae  o : eyelid appearance normal, no exudates present, eye moisture level normal  · Ears, Nose, Mouth and Throat  o Ears  o : external ear auricle normal, otic canal normal, TM with no reddness, effusion, retraction  o Nose  o : external normal, nasal mucosa normal, turbinates normal  o Oral Cavity  o : tongue no lesion, oral mucosa normal  o Throat  o : no erythemia, exudate or lesions  · Neck  o Inspection/Palpation  o : normal appearance, no masses or tenderness, trachea midline, no enlarged cervical or supraclavicular lymphnodes palpated  o Thyroid  o : gland size normal, nontender, no nodules or masses present on palpation, thyroid motion normal during swallowing  · Respiratory  o Respiratory Effort  o : breathing unlabored  o Inspection of Chest  o : normal appearance, no retractions  o Auscultation of Lungs  o : normal breath sounds throughout  · Cardiovascular  o Heart  o :   § Auscultation of Heart  § : regular rate and rhythm without murmur, PMI normal  o Peripheral Vascular System  o :   § Carotid Arteries  § : normal pulses bilaterally, no bruits present  § Pedal Pulses  § : pulses 2 bilaterally  § Extremities  § : no cyanosis, clubbing or edema; less than 2 second refill noted  · Musculoskeletal  o General  o : No joint swelling or deformity noted. Muscle tone, strength and development grossly normal.  · Skin and Subcutaneous Tissue  o General Inspection  o : no rashes or lesions present, no areas of discoloration  · Neurologic  o Mental Status Examination  o : judgement, insight intact, modd and affect appropriate  o Motor Examination  o : strength grossly intact in all four extremities  o Gait and Station  o : normal gait, able to stand without difficulty          Assessment  · Allergic  rhinitis due to allergen     477.9/J30.9  · Anemia     285.9/D64.9  · Anxiety disorder     300.00/F41.9  · Essential hypertension     401.9/I10  · Hyperlipidemia     272.4/E78.5  · Osteoarthritis     715.90/M19.90  · B12 deficiency     266.2/E53.8  · Neuropathy     355.9/G62.9      Plan  · Orders  o ACO-39: Current medications updated and reviewed (, 1159F) - - 04/07/2021  · Medications  o ferrous sulfate 325 mg (65 mg iron) oral tablet   SIG: TAKE 1 TABLET BY MOUTH ONCE DAILY for 30 days   DISP: (30) Tablet with 5 refills  Refilled on 04/07/2021     o Xanax 1 mg oral tablet   SIG: TAKE ONE TABLET BY MOUTH THREE TIMES A DAY - MAY CAUSE DROWSINESS   DISP: (90) Tablet with 0 refills  Refilled on 04/07/2021     o Zithromax Z-Chuy 250 mg oral tablet   SIG: take 2 tablets (500 mg) by oral route once daily for 1 day then 1 tablet (250 mg) by oral route once daily for 4 days   DISP: (6) Tablet with 0 refills  Discontinued on 04/07/2021     o Medications have been Reconciled  o Transition of Care or Provider Policy  · Instructions  o Advised that cheeses and other sources of dairy fats, animal fats, fast food, and the extras (candy, pastries, pies, doughnuts and cookies) all contain LDL raising nutrients. Advised to increase fruits, vegetables, whole grains, and to monitor portion sizes.   o Patient was educated/instructed on their diagnosis, treatment and medications prior to discharge from the clinic today.  o Minutes spent with patient including greater than 50% in Education/Counseling/Care Coordination.  o Time spent with the patient was minutes, more than 50% face to face.  · Disposition  o Return in 3 months for F/U            Electronically Signed by: RONI Mac -Author on April 7, 2021 11:45:49 AM

## 2021-05-14 NOTE — PROGRESS NOTES
Progress Note      Patient Name: Cristóbal Garcia   Patient ID: [id number]   Sex: Male   YOB: 1950    Primary Care Provider: Tim TAMAYO    Visit Date: January 6, 2021    Provider: RONI Mac   Location: [facility]   Location Address: [address] [place] [address], [address]  [place], [place]  [id number]   Location Phone: [phone]          Chief Complaint     The patient is here for a three month f/u of hyperlipidemia, htn, anemia, lbp, anxiety, vit b 12 def, allergies.       History Of Present Illness  Cristóbal Garcia is a 70 year old /White male who presents for evaluation and treatment of:      hyperlipidemia: not having any issues    left leg swells occasionally but water pills help.    B12 deficiency. doing well on shots.    arthralgia:  doing well on meds and takes otc because NSAID's state cause blood in stool.  continues pain management.    anxiety:  doing well on medication    Hypertension:  154/74.  doing well continues on medication.       Past Medical History  Disease Name Date Onset Notes   Alcoholism --  --    Anxiety --  --    Arthritis --  --    Cataracts, bilateral --  --    Forgetfulness --  --    Gastric reflux --  --    Hemorrhoids, unspecified hemorrhoid type --  --    High blood pressure --  --    High cholesterol --  --    Low back pain 06/11/2012 --    Lumbar radiculitis 06/11/2012 --    Night sweats --  --    Osteoarthritis 06/11/2012 --    Pain, Arm --  --    Pain, Cervical Spine --  --    Pain, Generalized --  --    Pain, Joint --  --    Pain, Leg --  --    Pain, Lumbar --  --    Pain, Other --  Legs toes and fingers   Reflux Disease --  --    Shoulder pain 06/11/2012 --          Past Surgical History  Procedure Name Date Notes   Appendectomy --  --    Cataract exctraction with lens implant --  --    Cholecystecomy --  --    Shoulder surgery --  --          Medication List  Name Date Started Instructions   aspirin 325 mg Oral Tablet  take 1  tablet (325 mg) by oral route once daily   atorvastatin 40 mg oral tablet 09/28/2020 TAKE 1 TABLET BY MOUTH ONCE DAILY for 30 days   CARVEDILOL 12.5 MG TABLET 12.5 Tablet 12/21/2020 TAKE ONE TABLET BY MOUTH TWO TIMES A DAY WITH FOOD   Claritin 10 mg oral tablet 03/18/2020 take 1 tablet (10 mg) by oral route once daily for 30 days   CYANOCOBALAMIN 1000 MCG/ML 1000 Solution 12/21/2020 INJECT 1 ML INTO THE MUSCLE ONCE A MONTH   ferrous sulfate 325 mg (65 mg iron) oral tablet 09/28/2020 TAKE 1 TABLET BY MOUTH ONCE DAILY for 30 days   gabapentin 300 mg oral capsule 10/26/2020 TAKE ONE CAPSULE BY MOUTH THREE TIMES DAILY FOR 30 DAYS   Glucosamine Chondroitin 550-30-1 mg oral capsule 03/18/2020 take 1 capsule by oral route 2 times a day for 30 days   HYDROCHLOROTHIAZIDE 12.5 MG 12.5 Capsule 12/21/2020 TAKE ONE CAPSULE BY MOUTH TWO TIMES A DAY   lactulose 10 gram/15 mL oral solution 03/18/2020 take 15 to 30ml by oral route QD for 30 days   Lasix 20 mg oral tablet 09/28/2020 take 1 tablet (20 mg) by oral route once daily for 30 days   NightTime Sleep Aid (diphen) 50 mg oral capsule  take 1-2 capsules by oral route daily for 30 days   nitroglycerin 0.4 mg sublingual tablet, sublingual 05/12/2017 place 1 tablet (0.4 mg) by buccal route at the first sign of an attack; no more than 3 tabs are recommended within a 15 minute period.   Osteo Bi-Flex 250-200 mg oral tablet 09/18/2019 take 1 tablet by oral route 2 times a day   Percocet  mg oral tablet  take 1 tablet by oral route every 6 hours as needed for 30 days   potassium gluconate 595 mg (99 mg) oral tablet extended release  take 1 tablet by oral route As needed for 30 days   Super B Complex + C 150 mg oral tablet  take 1 tablet by oral route daily for 30 days   Tylenol Arthritis Pain 650 mg oral tablet extended release 02/26/2018 take 1 tablet by oral route every 8 hours for 30 days   Xanax 1 mg oral tablet 12/17/2020 TAKE ONE TABLET BY MOUTH THREE TIMES A DAY - MAY  "CAUSE DROWSINESS         Allergy List  Allergen Name Date Reaction Notes   NO KNOWN DRUG ALLERGIES --  --  --        Allergies Reconciled  Family Medical History  Disease Name Relative/Age Notes   Stroke Father/   --    Heart Disease Brother/  Uncle/   --    Cancer, Unspecified Aunt/  Mother/80  Sister/  Uncle/   sister - breast cancer Mother- lung Uncle - lukemia         Social History  Finding Status Start/Stop Quantity Notes   Alcohol Former --/-- --  quit in 1997   Tobacco Current every day 16/-- 2pdd --          Immunizations  NameDate Admin Mfg Trade Name Lot Number Route Inj VIS Given VIS Publication   Shlluywoj00/23/2020 SKB Fluarix, quadrivalent, preservative free [id number] IM RD 09/23/2020    Comments: pt tolerated well   Qviaxkatw4499/23/2020 MSD PNEUMOVAX 23 [id number] IM LD 09/23/2020    Comments: pt tolerated well   Prevnar 1310/21/2019 WAL PREVNAR 13 [id number] IM LD 10/21/2019    Comments: pt tolerated injection well   Tdap06/15/2016 SKB BOOSTRIX [id number] IM LD 06/15/2016 01/24/2012   Comments: pt tolerated inj well         Review of Systems  · Constitutional  o Denies  o : fever, fatigue, weight loss, weight gain  · Cardiovascular  o Denies  o : lower extremity edema, claudication, chest pressure, palpitations  · Respiratory  o Denies  o : shortness of breath, wheezing, cough, hemoptysis, dyspnea on exertion  · Gastrointestinal  o Denies  o : nausea, vomiting, diarrhea, constipation, abdominal pain      Vitals  Date Time BP Position Site L\R Cuff Size HR RR TEMP (F) WT  HT  BMI kg/m2 BSA m2 O2 Sat FR L/min FiO2        01/06/2021 01:50 /74 Sitting    78 - R 16 97.2 209lbs 0oz 5'  8\" 31.78 2.13 97 %  21%          Physical Examination  · Constitutional  o Appearance  o : well-nourished, well developed, alert, in no acute distress  · Eyes  o Conjunctivae  o : conjunctivae normal  o Sclerae  o : sclerae white  o Pupils and Irises  o : pupils equal, round, and reactive to light and accommodation " bilaterally  o Corneas  o : tear film normal, no lesions present  o Eyelids/Ocular Adnexae  o : eyelid appearance normal, no exudates present, eye moisture level normal  · Respiratory  o Respiratory Effort  o : breathing unlabored  o Inspection of Chest  o : normal appearance, no retractions  o Auscultation of Lungs  o : normal breath sounds throughout  · Cardiovascular  o Heart  o :   § Auscultation of Heart  § : regular rate and rhythm without murmur, PMI normal  o Peripheral Vascular System  o :   § Pedal Pulses  § : pulses 2 bilaterally  § Extremities  § : no cyanosis, clubbing or edema; less than 2 second refill noted  · Musculoskeletal  o General  o : No joint swelling or deformity noted. Muscle tone, strength and development grossly normal.  · Skin and Subcutaneous Tissue  o General Inspection  o : no rashes or lesions present, no areas of discoloration  · Neurologic  o Mental Status Examination  o : judgement, insight intact, modd and affect appropriate  o Motor Examination  o : strength grossly intact in all four extremities  o Gait and Station  o : normal gait, able to stand without difficulty          Results  · In-Office Procedures  o Lab procedure  § IOP - Urine Drug Screen In-House [facility] ([address])   § Amphetamines Ur Ql: Negative   § Barbiturates Ur Ql: Negative   § Buprenorphine+Nor Ur Ql Scn: Negative   § Benzodiaz Ur Ql: Positive   § Cocaine Ur Ql: Negative   § Methadone Ur Ql: Negative   § Methamphet Ur Ql: Negative   § MDMA Ur Ql Scn: Negative   § Opiates Ur Ql: Negative   § Oxycodone Ur Ql: Positive   § PCP Ur Ql: Negative   § THC Ur Ql: Negative   § Temp in Range?: Within/Acceptable   § Control Seen?: Yes       Assessment  · Anemia     285.9/D64.9  · Essential hypertension     401.9/I10  · GERD (gastroesophageal reflux disease)     530.81/K21.9  · Hyperlipidemia     272.4/E78.5  · B12 deficiency     266.2/E53.8  · Edema     782.3/R60.9       is going to hold atorvastatin and see if helps muscle  aches and if does will change to pravastatin.       Plan  · Orders  o Iron panel (iron, TIBC, transferrin saturation) (14256, 07224, 80307) - 285.9/D64.9 - 04/06/2021  o HTN/Lipid Panel (CMP, Lipid) [facility] (20133, 81801) - 401.9/I10 - 04/06/2021  o CBC with Auto Diff [facility] (74350) - 401.9/I10 - 04/06/2021  o Urinalysis with Reflex Microscopy ([facility]) (18681) - 401.9/I10 - 04/06/2021  o ACO-14: Influenza immunization administered or previously received [facility] () - - 01/06/2021  o ACO-39: Current medications updated and reviewed (, 1159F) - - 01/06/2021  o B12 Folate levels (B12FO) - 266.2/E53.8 - 04/06/2021  · Medications  o Medications have been Reconciled  o Transition of Care or Provider Policy  · Instructions  o Advised that cheeses and other sources of dairy fats, animal fats, fast food, and the extras (candy, pastries, pies, doughnuts and cookies) all contain LDL raising nutrients. Advised to increase fruits, vegetables, whole grains, and to monitor portion sizes.   o Patient was educated/instructed on their diagnosis, treatment and medications prior to discharge from the clinic today.  o Minutes spent with patient including greater than 50% in Education/Counseling/Care Coordination.  o Time spent with the patient was minutes, more than 50% face to face.  · Disposition  o Return in 3 months for F/U            Electronically Signed by: RONI Mac -Author on January 6, 2021 02:45:09 PM

## 2021-05-15 VITALS
HEIGHT: 68 IN | TEMPERATURE: 96.7 F | RESPIRATION RATE: 16 BRPM | OXYGEN SATURATION: 97 % | BODY MASS INDEX: 31.83 KG/M2 | DIASTOLIC BLOOD PRESSURE: 49 MMHG | SYSTOLIC BLOOD PRESSURE: 149 MMHG | HEART RATE: 88 BPM | WEIGHT: 210 LBS

## 2021-05-15 VITALS
HEIGHT: 68 IN | HEART RATE: 68 BPM | BODY MASS INDEX: 31.26 KG/M2 | WEIGHT: 206.25 LBS | DIASTOLIC BLOOD PRESSURE: 57 MMHG | TEMPERATURE: 96.8 F | OXYGEN SATURATION: 95 % | SYSTOLIC BLOOD PRESSURE: 127 MMHG | RESPIRATION RATE: 16 BRPM

## 2021-05-15 VITALS
SYSTOLIC BLOOD PRESSURE: 138 MMHG | HEIGHT: 68 IN | DIASTOLIC BLOOD PRESSURE: 78 MMHG | TEMPERATURE: 97.9 F | RESPIRATION RATE: 16 BRPM | HEART RATE: 72 BPM | OXYGEN SATURATION: 93 % | BODY MASS INDEX: 31.52 KG/M2 | WEIGHT: 208 LBS

## 2021-05-16 VITALS
OXYGEN SATURATION: 100 % | DIASTOLIC BLOOD PRESSURE: 69 MMHG | BODY MASS INDEX: 32.13 KG/M2 | SYSTOLIC BLOOD PRESSURE: 166 MMHG | WEIGHT: 212 LBS | HEART RATE: 65 BPM | RESPIRATION RATE: 16 BRPM | TEMPERATURE: 96.8 F | HEIGHT: 68 IN

## 2021-05-16 VITALS
TEMPERATURE: 97.4 F | DIASTOLIC BLOOD PRESSURE: 52 MMHG | HEIGHT: 68 IN | WEIGHT: 216 LBS | RESPIRATION RATE: 18 BRPM | OXYGEN SATURATION: 100 % | SYSTOLIC BLOOD PRESSURE: 145 MMHG | BODY MASS INDEX: 32.74 KG/M2 | HEART RATE: 71 BPM

## 2021-05-16 VITALS
SYSTOLIC BLOOD PRESSURE: 130 MMHG | OXYGEN SATURATION: 100 % | HEIGHT: 68 IN | DIASTOLIC BLOOD PRESSURE: 54 MMHG | WEIGHT: 214 LBS | HEART RATE: 59 BPM | RESPIRATION RATE: 18 BRPM | TEMPERATURE: 97.1 F | BODY MASS INDEX: 32.43 KG/M2

## 2021-05-16 VITALS
BODY MASS INDEX: 32.89 KG/M2 | OXYGEN SATURATION: 100 % | TEMPERATURE: 97.4 F | SYSTOLIC BLOOD PRESSURE: 142 MMHG | RESPIRATION RATE: 16 BRPM | WEIGHT: 217 LBS | HEART RATE: 66 BPM | HEIGHT: 68 IN | DIASTOLIC BLOOD PRESSURE: 55 MMHG

## 2021-06-30 DIAGNOSIS — F41.9 ANXIETY: Primary | ICD-10-CM

## 2021-06-30 RX ORDER — ALPRAZOLAM 1 MG/1
TABLET ORAL
Qty: 90 TABLET | Refills: 0 | Status: SHIPPED | OUTPATIENT
Start: 2021-06-30 | End: 2021-07-28

## 2021-06-30 RX ORDER — ONDANSETRON 4 MG/1
TABLET, FILM COATED ORAL
Qty: 20 TABLET | Refills: 0 | Status: SHIPPED | OUTPATIENT
Start: 2021-06-30 | End: 2021-07-16

## 2021-07-07 ENCOUNTER — OFFICE VISIT (OUTPATIENT)
Dept: FAMILY MEDICINE CLINIC | Facility: CLINIC | Age: 71
End: 2021-07-07

## 2021-07-07 VITALS
SYSTOLIC BLOOD PRESSURE: 110 MMHG | TEMPERATURE: 97.6 F | DIASTOLIC BLOOD PRESSURE: 70 MMHG | HEART RATE: 69 BPM | WEIGHT: 205 LBS | BODY MASS INDEX: 31.07 KG/M2 | HEIGHT: 68 IN | RESPIRATION RATE: 16 BRPM | OXYGEN SATURATION: 95 %

## 2021-07-07 DIAGNOSIS — Z12.5 SCREENING FOR PROSTATE CANCER: ICD-10-CM

## 2021-07-07 DIAGNOSIS — F41.9 ANXIETY: ICD-10-CM

## 2021-07-07 DIAGNOSIS — Z72.0 TOBACCO USE: ICD-10-CM

## 2021-07-07 DIAGNOSIS — F17.210 CIGARETTE NICOTINE DEPENDENCE WITHOUT COMPLICATION: ICD-10-CM

## 2021-07-07 DIAGNOSIS — G89.29 OTHER CHRONIC PAIN: ICD-10-CM

## 2021-07-07 DIAGNOSIS — E78.5 HYPERLIPIDEMIA, UNSPECIFIED HYPERLIPIDEMIA TYPE: ICD-10-CM

## 2021-07-07 DIAGNOSIS — D50.9 IRON DEFICIENCY ANEMIA, UNSPECIFIED IRON DEFICIENCY ANEMIA TYPE: ICD-10-CM

## 2021-07-07 DIAGNOSIS — I10 BENIGN ESSENTIAL HTN: Primary | ICD-10-CM

## 2021-07-07 DIAGNOSIS — E53.8 B12 DEFICIENCY: ICD-10-CM

## 2021-07-07 PROBLEM — R68.89 FORGETFULNESS: Status: ACTIVE | Noted: 2021-07-07

## 2021-07-07 PROBLEM — H26.9 CATARACTS, BILATERAL: Status: ACTIVE | Noted: 2021-07-07

## 2021-07-07 PROBLEM — K64.9 HEMORRHOIDS: Status: ACTIVE | Noted: 2021-07-07

## 2021-07-07 PROBLEM — M47.12 CERVICAL SPONDYLOSIS WITH MYELOPATHY: Status: ACTIVE | Noted: 2017-02-13

## 2021-07-07 PROBLEM — M51.36 DEGENERATION OF LUMBAR INTERVERTEBRAL DISC: Status: ACTIVE | Noted: 2018-12-06

## 2021-07-07 PROBLEM — R60.0 LEG EDEMA: Status: ACTIVE | Noted: 2021-07-07

## 2021-07-07 PROBLEM — Z79.4 ENCOUNTER FOR LONG-TERM (CURRENT) USE OF INSULIN: Status: ACTIVE | Noted: 2020-03-13

## 2021-07-07 PROBLEM — K21.9 GASTROESOPHAGEAL REFLUX DISEASE: Status: ACTIVE | Noted: 2017-02-13

## 2021-07-07 PROBLEM — F10.20 ALCOHOLISM: Status: ACTIVE | Noted: 2021-07-07

## 2021-07-07 PROBLEM — R52 PAIN, GENERALIZED: Status: ACTIVE | Noted: 2021-07-07

## 2021-07-07 PROBLEM — M13.0 POLYARTHROPATHY: Status: ACTIVE | Noted: 2018-12-06

## 2021-07-07 PROCEDURE — 99214 OFFICE O/P EST MOD 30 MIN: CPT | Performed by: NURSE PRACTITIONER

## 2021-07-07 RX ORDER — LACTULOSE 10 G/15ML
SOLUTION ORAL
COMMUNITY
Start: 2021-06-30 | End: 2022-07-20

## 2021-07-07 RX ORDER — DIPHENHYDRAMINE HCL 50 MG
CAPSULE ORAL
COMMUNITY
End: 2023-01-20

## 2021-07-07 RX ORDER — GABAPENTIN 300 MG/1
300 CAPSULE ORAL 3 TIMES DAILY
COMMUNITY
Start: 2021-06-15 | End: 2021-07-15

## 2021-07-07 RX ORDER — OXYCODONE AND ACETAMINOPHEN 10; 325 MG/1; MG/1
1 TABLET ORAL EVERY 6 HOURS PRN
COMMUNITY
Start: 2021-07-01 | End: 2022-01-21

## 2021-07-07 RX ORDER — ASPIRIN 325 MG
TABLET ORAL
COMMUNITY
End: 2021-10-07 | Stop reason: SDUPTHER

## 2021-07-07 RX ORDER — HYDROCHLOROTHIAZIDE 12.5 MG/1
12.5 CAPSULE, GELATIN COATED ORAL 2 TIMES DAILY
COMMUNITY
Start: 2021-06-30 | End: 2021-12-03

## 2021-07-07 RX ORDER — UBIDECARENONE 75 MG
CAPSULE ORAL
COMMUNITY
End: 2021-08-27 | Stop reason: SDUPTHER

## 2021-07-07 RX ORDER — FUROSEMIDE 20 MG/1
20 TABLET ORAL DAILY
COMMUNITY
Start: 2021-06-30 | End: 2021-08-27

## 2021-07-07 RX ORDER — CARVEDILOL 12.5 MG/1
TABLET ORAL
COMMUNITY
Start: 2021-06-30 | End: 2021-12-03

## 2021-07-07 RX ORDER — CYANOCOBALAMIN 1000 UG/ML
INJECTION, SOLUTION INTRAMUSCULAR; SUBCUTANEOUS
COMMUNITY
Start: 2021-06-30 | End: 2021-08-27

## 2021-07-07 RX ORDER — NITROGLYCERIN 0.4 MG/1
TABLET SUBLINGUAL
COMMUNITY
Start: 2021-06-04 | End: 2022-07-20

## 2021-07-07 RX ORDER — ATORVASTATIN CALCIUM 40 MG/1
40 TABLET, FILM COATED ORAL DAILY
COMMUNITY
Start: 2021-06-15 | End: 2021-10-01

## 2021-07-07 RX ORDER — FERROUS SULFATE 325(65) MG
1 TABLET ORAL DAILY
COMMUNITY
Start: 2021-06-30 | End: 2021-10-01

## 2021-07-07 NOTE — PROGRESS NOTES
Chief Complaint  Heartburn (f/u), Hypertension (f/u), Anxiety (f/u), and Depression (f/u)    Subjective        Cristóbal Garcia presents to [facility]  Anxiety:  Doing well on alpazolam    Neuropathy:  Has tingling in extremities if doesn't take.    Vitamin B12 anemia:  Will check labs     Lumbago:  He sees pain management for and is currently still taking Gabapentin for neuropathy .        Past History:    Medical History: has a past medical history of Acid reflux disease, Alcoholism (CMS/HCC), Anxiety, Arm pain, Arthritis, Cataracts, bilateral, Cervical spine pain, Gastric reflux, Generalized pain, HBP (high blood pressure), Hemorrhoids, High cholesterol, Joint pain, Leg pain, Low back pain (06/11/2012), Lumbar pain, Lumbar radiculitis (06/11/2012), Night sweats, Osteoarthritis (06/11/2012), Other chronic pain, and Shoulder pain.     Surgical History: has a past surgical history that includes Appendectomy; Cataract extraction; Cholecystectomy; and Shoulder surgery.     Family History: family history includes Cancer in his sister and other family members; Cancer (age of onset: 80) in his mother; Heart disease in his brother and another family member; Stroke in his father.     Social History: reports that he has been smoking cigarettes. He has been smoking about 2.00 packs per day. He has never used smokeless tobacco. He reports previous alcohol use.    Allergies: Patient has no known allergies.          Current Outpatient Medications:   •  ALPRAZolam (XANAX) 1 MG tablet, TAKE ONE TABLET BY MOUTH THREE TIMES A DAY IF NEEDED - MAY CAUSE DROWSINESS, Disp: 90 tablet, Rfl: 0  •  aspirin 325 MG tablet, aspirin 325 mg oral tablet take 1 tablet (325 mg) by oral route once daily   Active, Disp: , Rfl:   •  atorvastatin (LIPITOR) 40 MG tablet, Take 40 mg by mouth Daily., Disp: , Rfl:   •  carvedilol (COREG) 12.5 MG tablet, TAKE ONE TABLET BY MOUTH TWO TIMES A DAY WITH FOOD, Disp: , Rfl:   •   "cyanocobalamin 1000 MCG/ML injection, INJECT 1 ML INTO THE MUSCLE ONCE A MONTH, Disp: , Rfl:   •  diphenhydrAMINE (BENADRYL) 50 MG capsule, NightTime Sleep Aid (diphen) 50 mg oral capsule take 1-2 capsules by oral route daily for 30 days   Active, Disp: , Rfl:   •  FeroSul 325 (65 Fe) MG tablet, Take 1 tablet by mouth Daily., Disp: , Rfl:   •  furosemide (LASIX) 20 MG tablet, Take 20 mg by mouth Daily., Disp: , Rfl:   •  gabapentin (NEURONTIN) 300 MG capsule, Take 300 mg by mouth 3 (Three) Times a Day., Disp: , Rfl:   •  hydroCHLOROthiazide (MICROZIDE) 12.5 MG capsule, Take 12.5 mg by mouth 2 (Two) Times a Day., Disp: , Rfl:   •  lactulose (CHRONULAC) 10 GM/15ML solution, , Disp: , Rfl:   •  nitroglycerin (NITROSTAT) 0.4 MG SL tablet, PLACE 1 TAB UNDER TONGUE FOR CHEST PAIN, MAY REPEAT EVERY 5 MINUTES UP TO 3 TABS THEN CALL 911/GO E.R., Disp: , Rfl:   •  ondansetron (ZOFRAN) 4 MG tablet, TAKE ONE TABLET BY MOUTH EVERY 8 HOURS, Disp: 20 tablet, Rfl: 0  •  oxyCODONE-acetaminophen (PERCOCET)  MG per tablet, Take 1 tablet by mouth Every 6 (Six) Hours As Needed. for pain, Disp: , Rfl:   •  vitamin B-12 (CYANOCOBALAMIN) 100 MCG tablet, Vitamin B12 IM once monthly   Suspended, Disp: , Rfl:     There are no discontinued medications.      Review of Systems   Constitutional: Negative for fever.   Respiratory: Negative for cough and shortness of breath.    Cardiovascular: Negative for chest pain, palpitations and leg swelling.   Neurological: Negative for dizziness, weakness, numbness and headache.        Objective         Vitals:    07/07/21 1152   BP: 110/70   Pulse: 69   Resp: 16   Temp: 97.6 °F (36.4 °C)   TempSrc: Infrared   SpO2: 95%   Weight: 93 kg (205 lb)   Height: 172.7 cm (68\")     Body mass index is 31.17 kg/m².         Physical Exam  Vitals reviewed.   Constitutional:       Appearance: Normal appearance. He is well-developed.   HENT:      Head: Normocephalic and atraumatic.      Mouth/Throat:      Pharynx: " No oropharyngeal exudate.   Eyes:      Conjunctiva/sclera: Conjunctivae normal.      Pupils: Pupils are equal, round, and reactive to light.   Cardiovascular:      Rate and Rhythm: Normal rate and regular rhythm.      Heart sounds: No murmur heard.   No friction rub. No gallop.    Pulmonary:      Effort: Pulmonary effort is normal.      Breath sounds: Normal breath sounds. No wheezing or rhonchi.   Skin:     General: Skin is warm and dry.   Neurological:      Mental Status: He is alert and oriented to person, place, and time.   Psychiatric:         Mood and Affect: Mood and affect normal.         Behavior: Behavior normal.         Thought Content: Thought content normal.         Judgment: Judgment normal.             Result Review :               Assessment and Plan     Diagnoses and all orders for this visit:    1. Benign essential HTN (Primary)  Assessment & Plan:  Hypertension is improving with treatment.  Continue current treatment regimen.  Blood pressure will be reassessed at the next regular appointment.    Orders:  -     Cancel: Comprehensive metabolic panel; Future  -     Cancel: Urinalysis With Culture If Indicated -; Future  -     Cancel: CBC No Differential; Future  -     CBC No Differential; Future  -     Comprehensive metabolic panel; Future  -     Urinalysis With Culture If Indicated -; Future    2. Tobacco use    3. Cigarette nicotine dependence without complication    4. Other chronic pain    5. Anxiety  Assessment & Plan:  Continues on medication and doing wel prashant alprazolam.      6. Hyperlipidemia, unspecified hyperlipidemia type  -     Cancel: Lipid Panel w/ Chol/HDL Ratio; Future  -     Cancel: Comprehensive metabolic panel; Future  -     Comprehensive metabolic panel; Future  -     Lipid Panel w/ Chol/HDL Ratio; Future    7. Screening for prostate cancer  -     Cancel: PSA SCREENING; Future  -     PSA SCREENING; Future    8. Iron deficiency anemia, unspecified iron deficiency anemia type  -      Cancel: Iron and TIBC; Future  -     Iron and TIBC; Future    9. B12 deficiency  -     Vitamin B12; Future  -     Folate; Future            Follow Up     Return in about 6 months (around 1/7/2022) for Next scheduled follow up.    Patient was given instructions and counseling regarding his condition or for health maintenance advice. Please see specific information pulled into the AVS if appropriate.

## 2021-07-14 DIAGNOSIS — G62.9 NEUROPATHY: Primary | ICD-10-CM

## 2021-07-15 RX ORDER — GABAPENTIN 300 MG/1
CAPSULE ORAL
Qty: 90 CAPSULE | Refills: 2 | Status: SHIPPED | OUTPATIENT
Start: 2021-07-15 | End: 2021-11-01

## 2021-07-16 RX ORDER — ONDANSETRON 4 MG/1
TABLET, FILM COATED ORAL
Qty: 20 TABLET | Refills: 0 | Status: SHIPPED | OUTPATIENT
Start: 2021-07-16 | End: 2021-07-30

## 2021-07-27 DIAGNOSIS — F41.9 ANXIETY: ICD-10-CM

## 2021-07-28 RX ORDER — ALPRAZOLAM 1 MG/1
TABLET ORAL
Qty: 90 TABLET | Refills: 0 | Status: SHIPPED | OUTPATIENT
Start: 2021-07-28 | End: 2021-08-27

## 2021-07-30 RX ORDER — ONDANSETRON 4 MG/1
TABLET, FILM COATED ORAL
Qty: 20 TABLET | Refills: 0 | Status: SHIPPED | OUTPATIENT
Start: 2021-07-30 | End: 2021-08-13

## 2021-08-13 RX ORDER — ONDANSETRON 4 MG/1
TABLET, FILM COATED ORAL
Qty: 20 TABLET | Refills: 0 | Status: SHIPPED | OUTPATIENT
Start: 2021-08-13 | End: 2021-08-27

## 2021-08-27 DIAGNOSIS — F41.9 ANXIETY: ICD-10-CM

## 2021-08-27 RX ORDER — ONDANSETRON 4 MG/1
TABLET, FILM COATED ORAL
Qty: 20 TABLET | Refills: 0 | Status: SHIPPED | OUTPATIENT
Start: 2021-08-27 | End: 2021-09-10

## 2021-08-27 RX ORDER — FUROSEMIDE 20 MG/1
TABLET ORAL
Qty: 30 TABLET | Refills: 5 | Status: SHIPPED | OUTPATIENT
Start: 2021-08-27 | End: 2022-02-28

## 2021-08-27 RX ORDER — ALPRAZOLAM 1 MG/1
TABLET ORAL
Qty: 90 TABLET | Refills: 0 | Status: SHIPPED | OUTPATIENT
Start: 2021-08-27 | End: 2021-09-23

## 2021-08-27 RX ORDER — CYANOCOBALAMIN 1000 UG/ML
INJECTION, SOLUTION INTRAMUSCULAR; SUBCUTANEOUS
Qty: 1 ML | Refills: 2 | Status: SHIPPED | OUTPATIENT
Start: 2021-08-27 | End: 2021-12-02

## 2021-08-30 ENCOUNTER — TELEPHONE (OUTPATIENT)
Dept: FAMILY MEDICINE CLINIC | Facility: CLINIC | Age: 71
End: 2021-08-30

## 2021-08-30 NOTE — TELEPHONE ENCOUNTER
Caller: PHOEBE CHAUNCEY    Relationship: DAUGHTER     Best call back number: PATIENT PHONE NUMBER [phone]    What is the best time to reach you: ANY     Who are you requesting to speak with (clinical staff, provider,  specific staff member): KRYSTEN         What was the call regarding: PAIN MANAGEMENT WANTS TO SCHEDULE PATIENT FOR THERAPY AND NEEDS PCP TO OKAY IT.     Do you require a callback: YES      PLEASE ADVISE PATIENT. THANKS.     [facility] HAS NO  VERBAL ON FILE FOR THIS PATIENT. THEREFORE, CALLER IS NOT ON THE  VERBAL.

## 2021-09-10 RX ORDER — ONDANSETRON 4 MG/1
TABLET, FILM COATED ORAL
Qty: 20 TABLET | Refills: 2 | Status: SHIPPED | OUTPATIENT
Start: 2021-09-10 | End: 2021-10-01

## 2021-09-22 DIAGNOSIS — F41.9 ANXIETY: ICD-10-CM

## 2021-09-23 RX ORDER — ALPRAZOLAM 1 MG/1
TABLET ORAL
Qty: 90 TABLET | Refills: 0 | Status: SHIPPED | OUTPATIENT
Start: 2021-09-23 | End: 2021-10-22

## 2021-09-27 ENCOUNTER — TELEPHONE (OUTPATIENT)
Dept: FAMILY MEDICINE CLINIC | Facility: CLINIC | Age: 71
End: 2021-09-27

## 2021-09-27 DIAGNOSIS — R30.0 DYSURIA: Primary | ICD-10-CM

## 2021-09-27 NOTE — TELEPHONE ENCOUNTER
----- Message from RONI Mac sent at 9/24/2021  2:21 PM EDT -----  Has a little blood and bacteria in urine.  If symptoms place for a urine culture.  Recheck in 2 weeks. A urine

## 2021-09-29 ENCOUNTER — TELEPHONE (OUTPATIENT)
Dept: FAMILY MEDICINE CLINIC | Facility: CLINIC | Age: 71
End: 2021-09-29

## 2021-09-29 NOTE — TELEPHONE ENCOUNTER
Caller: Cristóbal Garcia    Relationship: Self    Best call back number: [phone]    What is the best time to reach you: ANY    Who are you requesting to speak with (clinical staff, provider,  specific staff member): CLINICAL    What was the call regarding: PATIENT WAS TOLD TO GET LABS RECHECKED BUT WHEN HE CALLED LAB, LAB DOES NOT HAVE ANY ACTIVE ORDERS FOR HIM. PLEASE CALL AND ADVISE WHAT NEXT STEPS ARE. FAX COPY OF ORDER TO: [phone] ATTN: CARINE     Do you require a callback: YES

## 2021-10-01 RX ORDER — FERROUS SULFATE 325(65) MG
TABLET ORAL
Qty: 90 TABLET | Refills: 1 | Status: SHIPPED | OUTPATIENT
Start: 2021-10-01 | End: 2022-04-04

## 2021-10-01 RX ORDER — ONDANSETRON 4 MG/1
TABLET, FILM COATED ORAL
Qty: 20 TABLET | Refills: 2 | Status: SHIPPED | OUTPATIENT
Start: 2021-10-01 | End: 2021-11-02

## 2021-10-01 RX ORDER — ATORVASTATIN CALCIUM 40 MG/1
TABLET, FILM COATED ORAL
Qty: 90 TABLET | Refills: 1 | Status: SHIPPED | OUTPATIENT
Start: 2021-10-01 | End: 2022-07-20 | Stop reason: SDUPTHER

## 2021-10-06 ENCOUNTER — TELEPHONE (OUTPATIENT)
Dept: FAMILY MEDICINE CLINIC | Facility: CLINIC | Age: 71
End: 2021-10-06

## 2021-10-06 NOTE — TELEPHONE ENCOUNTER
----- Message from RONI Mac sent at 10/6/2021 10:51 AM EDT -----  Doesn't have a urinary infection.

## 2021-10-07 ENCOUNTER — OFFICE VISIT (OUTPATIENT)
Dept: FAMILY MEDICINE CLINIC | Facility: CLINIC | Age: 71
End: 2021-10-07

## 2021-10-07 ENCOUNTER — LAB (OUTPATIENT)
Dept: LAB | Facility: HOSPITAL | Age: 71
End: 2021-10-07

## 2021-10-07 VITALS
OXYGEN SATURATION: 97 % | BODY MASS INDEX: 31.61 KG/M2 | HEART RATE: 54 BPM | HEIGHT: 68 IN | TEMPERATURE: 97.5 F | RESPIRATION RATE: 16 BRPM | SYSTOLIC BLOOD PRESSURE: 132 MMHG | DIASTOLIC BLOOD PRESSURE: 70 MMHG | WEIGHT: 208.6 LBS

## 2021-10-07 DIAGNOSIS — D50.9 IRON DEFICIENCY ANEMIA, UNSPECIFIED IRON DEFICIENCY ANEMIA TYPE: ICD-10-CM

## 2021-10-07 DIAGNOSIS — I10 BENIGN ESSENTIAL HTN: ICD-10-CM

## 2021-10-07 DIAGNOSIS — Z23 NEED FOR INFLUENZA VACCINATION: Primary | ICD-10-CM

## 2021-10-07 DIAGNOSIS — F41.9 ANXIETY: ICD-10-CM

## 2021-10-07 DIAGNOSIS — E78.5 HYPERLIPIDEMIA, UNSPECIFIED HYPERLIPIDEMIA TYPE: ICD-10-CM

## 2021-10-07 DIAGNOSIS — R73.01 IMPAIRED FASTING GLUCOSE: ICD-10-CM

## 2021-10-07 DIAGNOSIS — E53.8 VITAMIN B 12 DEFICIENCY: ICD-10-CM

## 2021-10-07 DIAGNOSIS — R30.0 DYSURIA: ICD-10-CM

## 2021-10-07 DIAGNOSIS — G89.29 CHRONIC LOW BACK PAIN, UNSPECIFIED BACK PAIN LATERALITY, UNSPECIFIED WHETHER SCIATICA PRESENT: ICD-10-CM

## 2021-10-07 DIAGNOSIS — E53.8 B12 DEFICIENCY: ICD-10-CM

## 2021-10-07 DIAGNOSIS — M54.50 CHRONIC LOW BACK PAIN, UNSPECIFIED BACK PAIN LATERALITY, UNSPECIFIED WHETHER SCIATICA PRESENT: ICD-10-CM

## 2021-10-07 DIAGNOSIS — Z12.5 SCREENING FOR PROSTATE CANCER: ICD-10-CM

## 2021-10-07 DIAGNOSIS — K21.9 GASTROESOPHAGEAL REFLUX DISEASE WITHOUT ESOPHAGITIS: ICD-10-CM

## 2021-10-07 LAB
ALBUMIN SERPL-MCNC: 4.6 G/DL (ref 3.5–5.2)
ALBUMIN/GLOB SERPL: 1.9 G/DL
ALP SERPL-CCNC: 51 U/L (ref 39–117)
ALT SERPL W P-5'-P-CCNC: 18 U/L (ref 1–41)
ANION GAP SERPL CALCULATED.3IONS-SCNC: 11 MMOL/L (ref 5–15)
AST SERPL-CCNC: 19 U/L (ref 1–40)
BILIRUB SERPL-MCNC: 0.3 MG/DL (ref 0–1.2)
BILIRUB UR QL STRIP: NEGATIVE
BUN SERPL-MCNC: 16 MG/DL (ref 8–23)
BUN/CREAT SERPL: 12.3 (ref 7–25)
CALCIUM SPEC-SCNC: 10.4 MG/DL (ref 8.6–10.5)
CHLORIDE SERPL-SCNC: 98 MMOL/L (ref 98–107)
CHOLEST SERPL-MCNC: 153 MG/DL (ref 0–200)
CLARITY UR: CLEAR
CO2 SERPL-SCNC: 30 MMOL/L (ref 22–29)
COLOR UR: ABNORMAL
CREAT SERPL-MCNC: 1.3 MG/DL (ref 0.76–1.27)
DEPRECATED RDW RBC AUTO: 43.4 FL (ref 37–54)
ERYTHROCYTE [DISTWIDTH] IN BLOOD BY AUTOMATED COUNT: 12.5 % (ref 12.3–15.4)
FOLATE SERPL-MCNC: >20 NG/ML (ref 4.78–24.2)
GFR SERPL CREATININE-BSD FRML MDRD: 55 ML/MIN/1.73
GLOBULIN UR ELPH-MCNC: 2.4 GM/DL
GLUCOSE SERPL-MCNC: 97 MG/DL (ref 65–99)
GLUCOSE UR STRIP-MCNC: NEGATIVE MG/DL
HBA1C MFR BLD: 5.61 % (ref 4.8–5.6)
HCT VFR BLD AUTO: 38.6 % (ref 37.5–51)
HDLC SERPL QL: 3
HDLC SERPL-MCNC: 51 MG/DL (ref 40–60)
HGB BLD-MCNC: 12.7 G/DL (ref 13–17.7)
HGB UR QL STRIP.AUTO: NEGATIVE
IRON 24H UR-MRATE: 71 MCG/DL (ref 59–158)
IRON SATN MFR SERPL: 20 % (ref 20–50)
KETONES UR QL STRIP: NEGATIVE
LDLC SERPL CALC-MCNC: 82 MG/DL (ref 0–100)
LEUKOCYTE ESTERASE UR QL STRIP.AUTO: NEGATIVE
MCH RBC QN AUTO: 30.8 PG (ref 26.6–33)
MCHC RBC AUTO-ENTMCNC: 32.9 G/DL (ref 31.5–35.7)
MCV RBC AUTO: 93.7 FL (ref 79–97)
NITRITE UR QL STRIP: NEGATIVE
PH UR STRIP.AUTO: 5.5 [PH] (ref 5–8)
PLATELET # BLD AUTO: 265 10*3/MM3 (ref 140–450)
PMV BLD AUTO: 10.3 FL (ref 6–12)
POTASSIUM SERPL-SCNC: 4.1 MMOL/L (ref 3.5–5.2)
PROT SERPL-MCNC: 7 G/DL (ref 6–8.5)
PROT UR QL STRIP: NEGATIVE
PSA SERPL-MCNC: 0.89 NG/ML (ref 0–4)
RBC # BLD AUTO: 4.12 10*6/MM3 (ref 4.14–5.8)
SODIUM SERPL-SCNC: 139 MMOL/L (ref 136–145)
SP GR UR STRIP: 1.02 (ref 1–1.03)
TIBC SERPL-MCNC: 350 MCG/DL (ref 298–536)
TRANSFERRIN SERPL-MCNC: 235 MG/DL (ref 200–360)
TRIGL SERPL-MCNC: 108 MG/DL (ref 0–150)
UROBILINOGEN UR QL STRIP: ABNORMAL
VIT B12 BLD-MCNC: 1102 PG/ML (ref 211–946)
VLDLC SERPL-MCNC: 20 MG/DL (ref 5–40)
WBC # BLD AUTO: 5.94 10*3/MM3 (ref 3.4–10.8)

## 2021-10-07 PROCEDURE — 82746 ASSAY OF FOLIC ACID SERUM: CPT

## 2021-10-07 PROCEDURE — 84466 ASSAY OF TRANSFERRIN: CPT

## 2021-10-07 PROCEDURE — 80061 LIPID PANEL: CPT

## 2021-10-07 PROCEDURE — 85027 COMPLETE CBC AUTOMATED: CPT

## 2021-10-07 PROCEDURE — 36415 COLL VENOUS BLD VENIPUNCTURE: CPT

## 2021-10-07 PROCEDURE — 87086 URINE CULTURE/COLONY COUNT: CPT

## 2021-10-07 PROCEDURE — 99214 OFFICE O/P EST MOD 30 MIN: CPT | Performed by: NURSE PRACTITIONER

## 2021-10-07 PROCEDURE — 81003 URINALYSIS AUTO W/O SCOPE: CPT

## 2021-10-07 PROCEDURE — 80053 COMPREHEN METABOLIC PANEL: CPT

## 2021-10-07 PROCEDURE — G0103 PSA SCREENING: HCPCS

## 2021-10-07 PROCEDURE — 90662 IIV NO PRSV INCREASED AG IM: CPT | Performed by: NURSE PRACTITIONER

## 2021-10-07 PROCEDURE — 82607 VITAMIN B-12: CPT

## 2021-10-07 PROCEDURE — G0008 ADMIN INFLUENZA VIRUS VAC: HCPCS | Performed by: NURSE PRACTITIONER

## 2021-10-07 PROCEDURE — 83540 ASSAY OF IRON: CPT

## 2021-10-07 PROCEDURE — 83036 HEMOGLOBIN GLYCOSYLATED A1C: CPT

## 2021-10-07 RX ORDER — ASPIRIN 325 MG
TABLET ORAL
COMMUNITY
End: 2021-10-07

## 2021-10-07 RX ORDER — NALOXONE HYDROCHLORIDE 4 MG/.1ML
SPRAY NASAL
COMMUNITY
End: 2022-07-20

## 2021-10-07 RX ORDER — ASPIRIN 81 MG/1
81 TABLET ORAL DAILY
Qty: 90 TABLET | Refills: 1 | Status: SHIPPED | OUTPATIENT
Start: 2021-10-07 | End: 2022-04-04

## 2021-10-07 NOTE — PROGRESS NOTES
Chief Complaint  Follow-up (3mons), Blood Sugar Problem, Anxiety (has increased ), and Hypertension    Subjective        Cristóbal Garcia presents to [facility]  Anxiety:  Has increased.  Gets mad due to pain management.    Hypertension:  Doing well on medication and well controlled.    Impaired fasting glucose at home running 120 in the am.  The other day was 181.  Has been eating snacks when wakes up at 3 am and drinking sweet tea.    Hyperlipidemia:  Doing well on medication.    Vitamin b deficiency.        Past History:    Medical History: has a past medical history of Acid reflux disease, Alcoholism (HCC), Anxiety, Arm pain, Arthritis, Cataracts, bilateral, Cervical spine pain, Gastric reflux, Generalized pain, HBP (high blood pressure), Hemorrhoids, High cholesterol, Joint pain, Leg pain, Low back pain (06/11/2012), Lumbar pain, Lumbar radiculitis (06/11/2012), Night sweats, Osteoarthritis (06/11/2012), Other chronic pain, and Shoulder pain.     Surgical History: has a past surgical history that includes Appendectomy; Cataract extraction; Cholecystectomy; and Shoulder surgery.     Family History: family history includes Cancer in his sister and other family members; Cancer (age of onset: 80) in his mother; Heart disease in his brother and another family member; Stroke in his father.     Social History: reports that he has been smoking cigarettes. He has been smoking about 2.00 packs per day. He has never used smokeless tobacco. He reports previous alcohol use. He reports that he does not use drugs.    Allergies: Patient has no known allergies.          Current Outpatient Medications:   •  ALPRAZolam (XANAX) 1 MG tablet, TAKE ONE TABLET BY MOUTH THREE TIMES A DAY IF NEEDED - MAY CAUSE DROWSINESS, Disp: 90 tablet, Rfl: 0  •  atorvastatin (LIPITOR) 40 MG tablet, TAKE ONE TABLET BY MOUTH ONCE DAILY, Disp: 90 tablet, Rfl: 1  •  carvedilol (COREG) 12.5 MG tablet, TAKE ONE TABLET BY MOUTH  TWO TIMES A DAY WITH FOOD, Disp: , Rfl:   •  cyanocobalamin 1000 MCG/ML injection, INJECT 1 ML INTO THE MUSCLE ONCE A MONTH, Disp: 1 mL, Rfl: 2  •  diphenhydrAMINE (BENADRYL) 50 MG capsule, NightTime Sleep Aid (diphen) 50 mg oral capsule take 1-2 capsules by oral route daily for 30 days   Active, Disp: , Rfl:   •  FeroSul 325 (65 Fe) MG tablet, TAKE ONE TABLET BY MOUTH ONCE DAILY, Disp: 90 tablet, Rfl: 1  •  furosemide (LASIX) 20 MG tablet, TAKE ONE TABLET BY MOUTH EVERY DAY, Disp: 30 tablet, Rfl: 5  •  gabapentin (NEURONTIN) 300 MG capsule, TAKE ONE CAPSULE BY MOUTH THREE TIMES A DAY, Disp: 90 capsule, Rfl: 2  •  hydroCHLOROthiazide (MICROZIDE) 12.5 MG capsule, Take 12.5 mg by mouth 2 (Two) Times a Day., Disp: , Rfl:   •  lactulose (CHRONULAC) 10 GM/15ML solution, , Disp: , Rfl:   •  naloxone (Narcan) 4 MG/0.1ML nasal spray, Narcan 4 mg/actuation nasal spray  USE 1 SPRAY IN 1 NOSTRIL AS DIRECTED, Disp: , Rfl:   •  nitroglycerin (NITROSTAT) 0.4 MG SL tablet, PLACE 1 TAB UNDER TONGUE FOR CHEST PAIN, MAY REPEAT EVERY 5 MINUTES UP TO 3 TABS THEN CALL 911/GO E.R., Disp: , Rfl:   •  ondansetron (ZOFRAN) 4 MG tablet, TAKE ONE TABLET BY MOUTH EVERY 8 HOURS, Disp: 20 tablet, Rfl: 2  •  oxyCODONE-acetaminophen (PERCOCET)  MG per tablet, Take 1 tablet by mouth Every 6 (Six) Hours As Needed. for pain, Disp: , Rfl:   •  aspirin (aspirin) 81 MG EC tablet, Take 1 tablet by mouth Daily., Disp: 90 tablet, Rfl: 1    Medications Discontinued During This Encounter   Medication Reason   • aspirin 325 MG tablet Duplicate order   • aspirin 325 MG tablet *Therapy completed         Review of Systems   Constitutional: Negative for fever.   Respiratory: Negative for cough and shortness of breath.    Cardiovascular: Negative for chest pain, palpitations and leg swelling.   Neurological: Negative for dizziness, weakness, numbness and headache.        Objective         Vitals:    10/07/21 1008   BP: 132/70   BP Location: Right arm  "  Patient Position: Sitting   Cuff Size: Large Adult   Pulse: 54   Resp: 16   Temp: 97.5 °F (36.4 °C)   TempSrc: Infrared   SpO2: 97%   Weight: 94.6 kg (208 lb 9.6 oz)   Height: 172.7 cm (68\")     Body mass index is 31.72 kg/m².         Physical Exam  Vitals reviewed.   Constitutional:       Appearance: Normal appearance. He is well-developed.   HENT:      Head: Normocephalic and atraumatic.      Mouth/Throat:      Pharynx: No oropharyngeal exudate.   Eyes:      Conjunctiva/sclera: Conjunctivae normal.      Pupils: Pupils are equal, round, and reactive to light.   Cardiovascular:      Rate and Rhythm: Normal rate and regular rhythm.      Heart sounds: No murmur heard.   No friction rub. No gallop.    Pulmonary:      Effort: Pulmonary effort is normal.      Breath sounds: Normal breath sounds. No wheezing or rhonchi.   Skin:     General: Skin is warm and dry.   Neurological:      Mental Status: He is alert and oriented to person, place, and time.   Psychiatric:         Mood and Affect: Mood and affect normal.         Behavior: Behavior normal.         Thought Content: Thought content normal.         Judgment: Judgment normal.             Result Review :               Assessment and Plan     Diagnoses and all orders for this visit:    1. Need for influenza vaccination (Primary)  -     Fluzone High-Dose 65+yrs ([id number])    2. Impaired fasting glucose  -     Hemoglobin A1c; Future    3. Hyperlipidemia, unspecified hyperlipidemia type  Comments:  continue atorvastatin at current dose.    4. Benign essential HTN  Comments:  continue coreg at current dose.    5. Gastroesophageal reflux disease without esophagitis    6. Anxiety  Comments:  continue lorazepam at current dose.    7. Vitamin B 12 deficiency    8. Chronic low back pain, unspecified back pain laterality, unspecified whether sciatica present  -     Ambulatory Referral to Pain Management    Other orders  -     aspirin (aspirin) 81 MG EC tablet; Take 1 tablet by " mouth Daily.  Dispense: 90 tablet; Refill: 1              Follow Up     Return in about 3 months (around 1/7/2022) for Next scheduled follow up.    Patient was given instructions and counseling regarding his condition or for health maintenance advice. Please see specific information pulled into the AVS if appropriate.

## 2021-10-09 LAB — BACTERIA SPEC AEROBE CULT: NO GROWTH

## 2021-10-21 DIAGNOSIS — F41.9 ANXIETY: ICD-10-CM

## 2021-10-22 RX ORDER — ALPRAZOLAM 1 MG/1
TABLET ORAL
Qty: 90 TABLET | Refills: 0 | Status: SHIPPED | OUTPATIENT
Start: 2021-10-22 | End: 2021-11-19 | Stop reason: SDUPTHER

## 2021-11-01 DIAGNOSIS — G62.9 NEUROPATHY: ICD-10-CM

## 2021-11-01 RX ORDER — GABAPENTIN 300 MG/1
CAPSULE ORAL
Qty: 90 CAPSULE | Refills: 2 | Status: SHIPPED | OUTPATIENT
Start: 2021-11-01 | End: 2022-01-31

## 2021-11-02 RX ORDER — ONDANSETRON 4 MG/1
TABLET, FILM COATED ORAL
Qty: 20 TABLET | Refills: 2 | Status: SHIPPED | OUTPATIENT
Start: 2021-11-02

## 2021-11-18 DIAGNOSIS — F41.9 ANXIETY: ICD-10-CM

## 2021-11-19 DIAGNOSIS — F41.9 ANXIETY: ICD-10-CM

## 2021-11-19 RX ORDER — ALPRAZOLAM 1 MG/1
1 TABLET ORAL 3 TIMES DAILY PRN
Qty: 90 TABLET | Refills: 0 | Status: SHIPPED | OUTPATIENT
Start: 2021-11-19 | End: 2021-12-13 | Stop reason: SDUPTHER

## 2021-11-19 RX ORDER — ALPRAZOLAM 1 MG/1
1 TABLET ORAL 3 TIMES DAILY PRN
Qty: 90 TABLET | Refills: 0 | Status: CANCELLED | OUTPATIENT
Start: 2021-11-19

## 2021-11-19 RX ORDER — ALPRAZOLAM 1 MG/1
TABLET ORAL
Qty: 90 TABLET | Refills: 0 | OUTPATIENT
Start: 2021-11-19

## 2021-11-19 NOTE — TELEPHONE ENCOUNTER
Pts sister called and stated pharmacy will not refill his xanax due to him not being seen in office recently, patient has an appointment for 1/7/2022.    Patient also needs a UDS.

## 2021-11-19 NOTE — TELEPHONE ENCOUNTER
Patient is requesting a new rx be sent to pharmacy. They have not received anything last rx was for #90 tid no refills in october

## 2021-11-22 ENCOUNTER — TELEPHONE (OUTPATIENT)
Dept: FAMILY MEDICINE CLINIC | Facility: CLINIC | Age: 71
End: 2021-11-22

## 2021-11-22 NOTE — TELEPHONE ENCOUNTER
Spoke with pt informed him all his lab work is completed and he went to his appointment at [facility] on 10/22. He voiced understanding.

## 2021-11-22 NOTE — TELEPHONE ENCOUNTER
Caller: Cristóbal Garcia    Relationship: Self    Best call back number: [phone]    What is the best time to reach you: ANYTIME     Who are you requesting to speak with (clinical staff, provider,  specific staff member): ZEKE- NURSE     What was the call regarding: PLEASE CALL HIM REGARDING SOME LAB WORK AND ALSO PAIN DOCTOR INFORMATION THAT HE BELIEVES HE HAS MISSED.  HE IS VERY WORRIED THAT HE HAS UPSET YOU ALL.       Do you require a callback: YES

## 2021-12-02 RX ORDER — CYANOCOBALAMIN 1000 UG/ML
INJECTION, SOLUTION INTRAMUSCULAR; SUBCUTANEOUS
Qty: 1 ML | Refills: 2 | Status: SHIPPED | OUTPATIENT
Start: 2021-12-02 | End: 2022-02-28

## 2021-12-03 RX ORDER — CARVEDILOL 12.5 MG/1
TABLET ORAL
Qty: 60 TABLET | Refills: 2 | Status: SHIPPED | OUTPATIENT
Start: 2021-12-03 | End: 2022-02-28

## 2021-12-03 RX ORDER — HYDROCHLOROTHIAZIDE 12.5 MG/1
CAPSULE, GELATIN COATED ORAL
Qty: 60 CAPSULE | Refills: 2 | Status: SHIPPED | OUTPATIENT
Start: 2021-12-03 | End: 2022-02-28

## 2021-12-06 RX ORDER — ONDANSETRON 4 MG/1
TABLET, FILM COATED ORAL
Qty: 20 TABLET | Refills: 2 | OUTPATIENT
Start: 2021-12-06

## 2021-12-09 ENCOUNTER — TELEPHONE (OUTPATIENT)
Dept: FAMILY MEDICINE CLINIC | Facility: CLINIC | Age: 71
End: 2021-12-09

## 2021-12-13 DIAGNOSIS — F41.9 ANXIETY: ICD-10-CM

## 2021-12-13 RX ORDER — ALPRAZOLAM 1 MG/1
1 TABLET ORAL 3 TIMES DAILY PRN
Qty: 90 TABLET | Refills: 0 | Status: SHIPPED | OUTPATIENT
Start: 2021-12-13 | End: 2022-01-13

## 2021-12-13 NOTE — TELEPHONE ENCOUNTER
Pt said he isn't going back to either pain management. He is going to wean himself off of the pain medication as he states they are going to run him to death and he can't depend on someone to bring him up here everytime they call him to come up.  He will scheduled to discuss other options for pain.    He needs a refill of his xanax. UDS and consent UT>

## 2021-12-13 NOTE — TELEPHONE ENCOUNTER
Patient is requesting a call from José Luis stating it is important, that's all he would tell me.  Patient was trying to get in the week.

## 2021-12-15 ENCOUNTER — TELEPHONE (OUTPATIENT)
Dept: FAMILY MEDICINE CLINIC | Facility: CLINIC | Age: 71
End: 2021-12-15

## 2022-01-13 ENCOUNTER — TELEPHONE (OUTPATIENT)
Dept: FAMILY MEDICINE CLINIC | Facility: CLINIC | Age: 72
End: 2022-01-13

## 2022-01-13 DIAGNOSIS — F41.9 ANXIETY: ICD-10-CM

## 2022-01-13 RX ORDER — ALPRAZOLAM 1 MG/1
TABLET ORAL
Qty: 90 TABLET | Refills: 0 | Status: SHIPPED | OUTPATIENT
Start: 2022-01-13 | End: 2022-01-14

## 2022-01-14 RX ORDER — ALPRAZOLAM 1 MG/1
1 TABLET ORAL 3 TIMES DAILY PRN
Qty: 90 TABLET | Refills: 0 | Status: SHIPPED | OUTPATIENT
Start: 2022-01-14 | End: 2022-02-10

## 2022-01-21 ENCOUNTER — OFFICE VISIT (OUTPATIENT)
Dept: FAMILY MEDICINE CLINIC | Facility: CLINIC | Age: 72
End: 2022-01-21

## 2022-01-21 VITALS
RESPIRATION RATE: 16 BRPM | HEART RATE: 73 BPM | BODY MASS INDEX: 32.01 KG/M2 | DIASTOLIC BLOOD PRESSURE: 70 MMHG | HEIGHT: 68 IN | OXYGEN SATURATION: 99 % | TEMPERATURE: 97.8 F | SYSTOLIC BLOOD PRESSURE: 100 MMHG | WEIGHT: 211.2 LBS

## 2022-01-21 DIAGNOSIS — Z11.59 NEED FOR HEPATITIS C SCREENING TEST: Primary | ICD-10-CM

## 2022-01-21 DIAGNOSIS — I10 BENIGN ESSENTIAL HTN: ICD-10-CM

## 2022-01-21 DIAGNOSIS — Z51.81 MEDICATION MONITORING ENCOUNTER: ICD-10-CM

## 2022-01-21 DIAGNOSIS — K21.9 GASTROESOPHAGEAL REFLUX DISEASE WITHOUT ESOPHAGITIS: ICD-10-CM

## 2022-01-21 DIAGNOSIS — E78.2 MIXED HYPERLIPIDEMIA: ICD-10-CM

## 2022-01-21 DIAGNOSIS — E53.8 VITAMIN B 12 DEFICIENCY: ICD-10-CM

## 2022-01-21 DIAGNOSIS — M13.0 POLYARTHROPATHY: ICD-10-CM

## 2022-01-21 DIAGNOSIS — Z00.00 MEDICARE ANNUAL WELLNESS VISIT, SUBSEQUENT: ICD-10-CM

## 2022-01-21 LAB
AMPHET+METHAMPHET UR QL: NEGATIVE
AMPHETAMINE INTERNAL CONTROL: ABNORMAL
AMPHETAMINES UR QL: NEGATIVE
BARBITURATE INTERNAL CONTROL: ABNORMAL
BARBITURATES UR QL SCN: NEGATIVE
BENZODIAZ UR QL SCN: POSITIVE
BENZODIAZEPINE INTERNAL CONTROL: ABNORMAL
BUPRENORPHINE INTERNAL CONTROL: ABNORMAL
BUPRENORPHINE SERPL-MCNC: NEGATIVE NG/ML
CANNABINOIDS SERPL QL: NEGATIVE
COCAINE INTERNAL CONTROL: ABNORMAL
COCAINE UR QL: NEGATIVE
EXPIRATION DATE: ABNORMAL
Lab: ABNORMAL
MDMA (ECSTASY) INTERNAL CONTROL: ABNORMAL
MDMA UR QL SCN: NEGATIVE
METHADONE INTERNAL CONTROL: ABNORMAL
METHADONE UR QL SCN: NEGATIVE
METHAMPHETAMINE INTERNAL CONTROL: ABNORMAL
OPIATES INTERNAL CONTROL: ABNORMAL
OPIATES UR QL: NEGATIVE
OXYCODONE INTERNAL CONTROL: ABNORMAL
OXYCODONE UR QL SCN: NEGATIVE
PCP UR QL SCN: NEGATIVE
PHENCYCLIDINE INTERNAL CONTROL: ABNORMAL
THC INTERNAL CONTROL: ABNORMAL

## 2022-01-21 PROCEDURE — 1170F FXNL STATUS ASSESSED: CPT | Performed by: NURSE PRACTITIONER

## 2022-01-21 PROCEDURE — 1159F MED LIST DOCD IN RCRD: CPT | Performed by: NURSE PRACTITIONER

## 2022-01-21 PROCEDURE — G0439 PPPS, SUBSEQ VISIT: HCPCS | Performed by: NURSE PRACTITIONER

## 2022-01-21 PROCEDURE — 99214 OFFICE O/P EST MOD 30 MIN: CPT | Performed by: NURSE PRACTITIONER

## 2022-01-21 PROCEDURE — 80305 DRUG TEST PRSMV DIR OPT OBS: CPT | Performed by: NURSE PRACTITIONER

## 2022-01-21 RX ORDER — TRAMADOL HYDROCHLORIDE 50 MG/1
50 TABLET ORAL EVERY 6 HOURS PRN
Qty: 60 TABLET | Refills: 0 | Status: SHIPPED | OUTPATIENT
Start: 2022-01-21 | End: 2022-02-24 | Stop reason: SDUPTHER

## 2022-01-21 NOTE — PROGRESS NOTES
Chief Complaint  No chief complaint on file.    Subjective        Cristóbal Garcia presents to [facility]  Anxiety:  Has increased.  Gets mad due to pain management.    Hypertension:  Doing well on medication and well controlled.    Impaired fasting glucose at home running 120 in the am.  The other day was 181.  Has been eating snacks when wakes up at 3 am and drinking sweet tea.    Notes that he quit seeing pain management. He does understand that we cannot restart his pain management here.    Hyperlipidemia:  Doing well on medication.    Vitamin b deficiency.        Past History:    Medical History: has a past medical history of Acid reflux disease, Alcoholism (HCC), Anxiety, Arm pain, Arthritis, Cataracts, bilateral, Cervical spine pain, Gastric reflux, Generalized pain, HBP (high blood pressure), Hemorrhoids, High cholesterol, Joint pain, Leg pain, Low back pain (06/11/2012), Lumbar pain, Lumbar radiculitis (06/11/2012), Night sweats, Osteoarthritis (06/11/2012), Other chronic pain, and Shoulder pain.     Surgical History: has a past surgical history that includes Appendectomy; Cataract extraction; Cholecystectomy; and Shoulder surgery.     Family History: family history includes Cancer in his sister and other family members; Cancer (age of onset: 80) in his mother; Heart disease in his brother and another family member; Stroke in his father.     Social History: reports that he has been smoking cigarettes. He has been smoking about 2.00 packs per day. He has never used smokeless tobacco. He reports previous alcohol use. He reports that he does not use drugs.    Allergies: Patient has no known allergies.          Current Outpatient Medications:   •  ALPRAZolam (XANAX) 1 MG tablet, Take 1 tablet by mouth 3 (Three) Times a Day As Needed for Anxiety., Disp: 90 tablet, Rfl: 0  •  aspirin (aspirin) 81 MG EC tablet, Take 1 tablet by mouth Daily., Disp: 90 tablet, Rfl: 1  •  atorvastatin  (LIPITOR) 40 MG tablet, TAKE ONE TABLET BY MOUTH ONCE DAILY, Disp: 90 tablet, Rfl: 1  •  carvedilol (COREG) 12.5 MG tablet, TAKE ONE TABLET BY MOUTH TWO TIMES A DAY WITH FOOD, Disp: 60 tablet, Rfl: 2  •  cyanocobalamin 1000 MCG/ML injection, INJECT 1 ML INTO THE MUSCLE ONCE A MONTH, Disp: 1 mL, Rfl: 2  •  diphenhydrAMINE (BENADRYL) 50 MG capsule, NightTime Sleep Aid (diphen) 50 mg oral capsule take 1-2 capsules by oral route daily for 30 days   Active, Disp: , Rfl:   •  FeroSul 325 (65 Fe) MG tablet, TAKE ONE TABLET BY MOUTH ONCE DAILY, Disp: 90 tablet, Rfl: 1  •  furosemide (LASIX) 20 MG tablet, TAKE ONE TABLET BY MOUTH EVERY DAY, Disp: 30 tablet, Rfl: 5  •  gabapentin (NEURONTIN) 300 MG capsule, TAKE ONE CAPSULE THREE TIMES A DAY, Disp: 90 capsule, Rfl: 2  •  hydroCHLOROthiazide (MICROZIDE) 12.5 MG capsule, TAKE ONE CAPSULE BY MOUTH TWO TIMES A DAY, Disp: 60 capsule, Rfl: 2  •  lactulose (CHRONULAC) 10 GM/15ML solution, , Disp: , Rfl:   •  naloxone (Narcan) 4 MG/0.1ML nasal spray, Narcan 4 mg/actuation nasal spray  USE 1 SPRAY IN 1 NOSTRIL AS DIRECTED, Disp: , Rfl:   •  nitroglycerin (NITROSTAT) 0.4 MG SL tablet, PLACE 1 TAB UNDER TONGUE FOR CHEST PAIN, MAY REPEAT EVERY 5 MINUTES UP TO 3 TABS THEN CALL 911/GO E.R., Disp: , Rfl:   •  ondansetron (ZOFRAN) 4 MG tablet, TAKE ONE TABLET BY MOUTH EVERY 8 HOURS, Disp: 20 tablet, Rfl: 2  •  oxyCODONE-acetaminophen (PERCOCET)  MG per tablet, Take 1 tablet by mouth Every 6 (Six) Hours As Needed. for pain, Disp: , Rfl:     There are no discontinued medications.      Review of Systems   Constitutional: Negative for fever.   Respiratory: Negative for cough and shortness of breath.    Cardiovascular: Negative for chest pain, palpitations and leg swelling.   Neurological: Negative for dizziness, weakness, numbness and headache.        Objective         There were no vitals filed for this visit.  There is no height or weight on file to calculate BMI.         Physical  Exam  Vitals reviewed.   Constitutional:       Appearance: Normal appearance. He is well-developed.   HENT:      Head: Normocephalic and atraumatic.      Mouth/Throat:      Pharynx: No oropharyngeal exudate.   Eyes:      Conjunctiva/sclera: Conjunctivae normal.      Pupils: Pupils are equal, round, and reactive to light.   Cardiovascular:      Rate and Rhythm: Normal rate and regular rhythm.      Heart sounds: No murmur heard.  No friction rub. No gallop.    Pulmonary:      Effort: Pulmonary effort is normal.      Breath sounds: Normal breath sounds. No wheezing or rhonchi.   Skin:     General: Skin is warm and dry.   Neurological:      Mental Status: He is alert and oriented to person, place, and time.   Psychiatric:         Mood and Affect: Mood and affect normal.         Behavior: Behavior normal.         Thought Content: Thought content normal.         Judgment: Judgment normal.             Result Review :               Assessment and Plan     There are no diagnoses linked to this encounter.          Follow Up     No follow-ups on file.    Patient was given instructions and counseling regarding his condition or for health maintenance advice. Please see specific information pulled into the AVS if appropriate.

## 2022-01-21 NOTE — PATIENT INSTRUCTIONS
Calorie Counting for Weight Loss  Calories are units of energy. Your body needs a certain number of calories from food to keep going throughout the day. When you eat or drink more calories than your body needs, your body stores the extra calories mostly as fat. When you eat or drink fewer calories than your body needs, your body burns fat to get the energy it needs.  Calorie counting means keeping track of how many calories you eat and drink each day. Calorie counting can be helpful if you need to lose weight. If you eat fewer calories than your body needs, you should lose weight. Ask your health care provider what a healthy weight is for you.  For calorie counting to work, you will need to eat the right number of calories each day to lose a healthy amount of weight per week. A dietitian can help you figure out how many calories you need in a day and will suggest ways to reach your calorie goal.  · A healthy amount of weight to lose each week is usually 1-2 lb (0.5-0.9 kg). This usually means that your daily calorie intake should be reduced by 500-750 calories.  · Eating 1,200-1,500 calories a day can help most women lose weight.  · Eating 1,500-1,800 calories a day can help most men lose weight.  What do I need to know about calorie counting?  Work with your health care provider or dietitian to determine how many calories you should get each day. To meet your daily calorie goal, you will need to:  · Find out how many calories are in each food that you would like to eat. Try to do this before you eat.  · Decide how much of the food you plan to eat.  · Keep a food log. Do this by writing down what you ate and how many calories it had.  To successfully lose weight, it is important to balance calorie counting with a healthy lifestyle that includes regular activity.  Where do I find calorie information?    The number of calories in a food can be found on a Nutrition Facts label. If a food does not have a Nutrition Facts  label, try to look up the calories online or ask your dietitian for help.  Remember that calories are listed per serving. If you choose to have more than one serving of a food, you will have to multiply the calories per serving by the number of servings you plan to eat. For example, the label on a package of bread might say that a serving size is 1 slice and that there are 90 calories in a serving. If you eat 1 slice, you will have eaten 90 calories. If you eat 2 slices, you will have eaten 180 calories.  How do I keep a food log?  After each time that you eat, record the following in your food log as soon as possible:  · What you ate. Be sure to include toppings, sauces, and other extras on the food.  · How much you ate. This can be measured in cups, ounces, or number of items.  · How many calories were in each food and drink.  · The total number of calories in the food you ate.  Keep your food log near you, such as in a pocket-sized notebook or on an jono or website on your mobile phone. Some programs will calculate calories for you and show you how many calories you have left to meet your daily goal.  What are some portion-control tips?  · Know how many calories are in a serving. This will help you know how many servings you can have of a certain food.  · Use a measuring cup to measure serving sizes. You could also try weighing out portions on a kitchen scale. With time, you will be able to estimate serving sizes for some foods.  · Take time to put servings of different foods on your favorite plates or in your favorite bowls and cups so you know what a serving looks like.  · Try not to eat straight from a food's packaging, such as from a bag or box. Eating straight from the package makes it hard to see how much you are eating and can lead to overeating. Put the amount you would like to eat in a cup or on a plate to make sure you are eating the right portion.  · Use smaller plates, glasses, and bowls for smaller  portions and to prevent overeating.  · Try not to multitask. For example, avoid watching TV or using your computer while eating. If it is time to eat, sit down at a table and enjoy your food. This will help you recognize when you are full. It will also help you be more mindful of what and how much you are eating.  What are tips for following this plan?  Reading food labels  · Check the calorie count compared with the serving size. The serving size may be smaller than what you are used to eating.  · Check the source of the calories. Try to choose foods that are high in protein, fiber, and vitamins, and low in saturated fat, trans fat, and sodium.  Shopping  · Read nutrition labels while you shop. This will help you make healthy decisions about which foods to buy.  · Pay attention to nutrition labels for low-fat or fat-free foods. These foods sometimes have the same number of calories or more calories than the full-fat versions. They also often have added sugar, starch, or salt to make up for flavor that was removed with the fat.  · Make a grocery list of lower-calorie foods and stick to it.  Cooking  · Try to cook your favorite foods in a healthier way. For example, try baking instead of frying.  · Use low-fat dairy products.  Meal planning  · Use more fruits and vegetables. One-half of your plate should be fruits and vegetables.  · Include lean proteins, such as chicken, turkey, and fish.  Lifestyle  Each week, aim to do one of the following:  · 150 minutes of moderate exercise, such as walking.  · 75 minutes of vigorous exercise, such as running.  General information  · Know how many calories are in the foods you eat most often. This will help you calculate calorie counts faster.  · Find a way of tracking calories that works for you. Get creative. Try different apps or programs if writing down calories does not work for you.  What foods should I eat?    · Eat nutritious foods. It is better to have a nutritious,  high-calorie food, such as an avocado, than a food with few nutrients, such as a bag of potato chips.  · Use your calories on foods and drinks that will fill you up and will not leave you hungry soon after eating.  ? Examples of foods that fill you up are nuts and nut butters, vegetables, lean proteins, and high-fiber foods such as whole grains. High-fiber foods are foods with more than 5 g of fiber per serving.  · Pay attention to calories in drinks. Low-calorie drinks include water and unsweetened drinks.  The items listed above may not be a complete list of foods and beverages you can eat. Contact a dietitian for more information.  What foods should I limit?  Limit foods or drinks that are not good sources of vitamins, minerals, or protein or that are high in unhealthy fats. These include:  · Candy.  · Other sweets.  · Sodas, specialty coffee drinks, alcohol, and juice.  The items listed above may not be a complete list of foods and beverages you should avoid. Contact a dietitian for more information.  How do I count calories when eating out?  · Pay attention to portions. Often, portions are much larger when eating out. Try these tips to keep portions smaller:  ? Consider sharing a meal instead of getting your own.  ? If you get your own meal, eat only half of it. Before you start eating, ask for a container and put half of your meal into it.  ? When available, consider ordering smaller portions from the menu instead of full portions.  · Pay attention to your food and drink choices. Knowing the way food is cooked and what is included with the meal can help you eat fewer calories.  ? If calories are listed on the menu, choose the lower-calorie options.  ? Choose dishes that include vegetables, fruits, whole grains, low-fat dairy products, and lean proteins.  ? Choose items that are boiled, broiled, grilled, or steamed. Avoid items that are buttered, battered, fried, or served with cream sauce. Items labeled as  crispy are usually fried, unless stated otherwise.  ? Choose water, low-fat milk, unsweetened iced tea, or other drinks without added sugar. If you want an alcoholic beverage, choose a lower-calorie option, such as a glass of wine or light beer.  ? Ask for dressings, sauces, and syrups on the side. These are usually high in calories, so you should limit the amount you eat.  ? If you want a salad, choose a garden salad and ask for grilled meats. Avoid extra toppings such as armendariz, cheese, or fried items. Ask for the dressing on the side, or ask for olive oil and vinegar or lemon to use as dressing.  · Estimate how many servings of a food you are given. Knowing serving sizes will help you be aware of how much food you are eating at restaurants.  Where to find more information  · Centers for Disease Control and Prevention: www.cdc.gov  · [place] Department of Agriculture: myplate.gov  Summary  · Calorie counting means keeping track of how many calories you eat and drink each day. If you eat fewer calories than your body needs, you should lose weight.  · A healthy amount of weight to lose per week is usually 1-2 lb (0.5-0.9 kg). This usually means reducing your daily calorie intake by 500-750 calories.  · The number of calories in a food can be found on a Nutrition Facts label. If a food does not have a Nutrition Facts label, try to look up the calories online or ask your dietitian for help.  · Use smaller plates, glasses, and bowls for smaller portions and to prevent overeating.  · Use your calories on foods and drinks that will fill you up and not leave you hungry shortly after a meal.  This information is not intended to replace advice given to you by your health care provider. Make sure you discuss any questions you have with your health care provider.  Document Revised: 01/28/2021 Document Reviewed: 01/28/2021  [employer] Patient Education © 2021 [employer]

## 2022-01-21 NOTE — PROGRESS NOTES
The ABCs of the Annual Wellness Visit  Subsequent Medicare Wellness Visit    Chief Complaint   Patient presents with   • b12 def     f/u   • Anemia     fatigued   • Anxiety     f/u   • Depression     f/u   • Medicare Wellness-subsequent   • Hypertension     f/u   • Joint Pain     requesting pain medication      Subjective    History of Present Illness:  Cristóbal Garcia is a 71 y.o. male who presents for a Subsequent Medicare Wellness Visit.  Anxiety:  Has increased.  Gets mad due to pain management.  Was seeing pain management.  Had an altercation  With medication.  Went to see Dr. Yip after that.   Was seen on the 12/22.      Has been weak and sick since out medication of hydrocodone for.      Hypertension:  Doing well on medication and well controlled.  100/70    Impaired fasting glucose at home running 120 in the am.  The other day was 181.  Has been eating snacks when wakes up at 3 am and drinking sweet tea.    Notes that he quit seeing pain management. He does understand that we cannot restart his pain management here.  Is willing to try Tramadol.  Has tried CBD but hasn't helped.    Hyperlipidemia:  Doing well on medication.    Vitamin b deficiency.  Doing well and helps with legs.    The following portions of the patient's history were reviewed and   updated as appropriate: allergies, current medications, past family history, past medical history, past social history, past surgical history and problem list.    Compared to one year ago, the patient feels his physical   health is worse.    Compared to one year ago, the patient feels his mental   health is worse.    Recent Hospitalizations:  He was not admitted to the hospital during the last year.       Current Medical Providers:  Patient Care Team:  Tim Elaine APRN as PCP - General (Nurse Practitioner)    Outpatient Medications Prior to Visit   Medication Sig Dispense Refill   • ALPRAZolam (XANAX) 1 MG tablet Take 1 tablet by mouth 3 (Three) Times a Day As  Needed for Anxiety. 90 tablet 0   • aspirin (aspirin) 81 MG EC tablet Take 1 tablet by mouth Daily. 90 tablet 1   • atorvastatin (LIPITOR) 40 MG tablet TAKE ONE TABLET BY MOUTH ONCE DAILY 90 tablet 1   • carvedilol (COREG) 12.5 MG tablet TAKE ONE TABLET BY MOUTH TWO TIMES A DAY WITH FOOD 60 tablet 2   • cyanocobalamin 1000 MCG/ML injection INJECT 1 ML INTO THE MUSCLE ONCE A MONTH 1 mL 2   • diphenhydrAMINE (BENADRYL) 50 MG capsule NightTime Sleep Aid (diphen) 50 mg oral capsule take 1-2 capsules by oral route daily for 30 days   Active     • FeroSul 325 (65 Fe) MG tablet TAKE ONE TABLET BY MOUTH ONCE DAILY 90 tablet 1   • furosemide (LASIX) 20 MG tablet TAKE ONE TABLET BY MOUTH EVERY DAY 30 tablet 5   • gabapentin (NEURONTIN) 300 MG capsule TAKE ONE CAPSULE THREE TIMES A DAY 90 capsule 2   • hydroCHLOROthiazide (MICROZIDE) 12.5 MG capsule TAKE ONE CAPSULE BY MOUTH TWO TIMES A DAY 60 capsule 2   • lactulose (CHRONULAC) 10 GM/15ML solution      • naloxone (Narcan) 4 MG/0.1ML nasal spray Narcan 4 mg/actuation nasal spray   USE 1 SPRAY IN 1 NOSTRIL AS DIRECTED     • nitroglycerin (NITROSTAT) 0.4 MG SL tablet PLACE 1 TAB UNDER TONGUE FOR CHEST PAIN, MAY REPEAT EVERY 5 MINUTES UP TO 3 TABS THEN CALL 911/GO E.R.     • ondansetron (ZOFRAN) 4 MG tablet TAKE ONE TABLET BY MOUTH EVERY 8 HOURS 20 tablet 2   • oxyCODONE-acetaminophen (PERCOCET)  MG per tablet Take 1 tablet by mouth Every 6 (Six) Hours As Needed. for pain       No facility-administered medications prior to visit.       No opioid medication identified on active medication list. I have reviewed chart for other potential  high risk medication/s and harmful drug interactions in the elderly.          Aspirin is on active medication list. Aspirin use is indicated based on review of current medical condition/s. Pros and cons of this therapy have been discussed today. Benefits of this medication outweigh potential harm.  Patient has been encouraged to continue  "taking this medication.  .      Patient Active Problem List   Diagnosis   • Alcoholism (HCC)   • Anxiety   • Benign essential HTN   • Cataracts, bilateral   • Cervical spondylosis with myelopathy   • Degeneration of lumbar intervertebral disc   • Encounter for long-term (current) use of insulin (HCC)   • Forgetfulness   • Gastroesophageal reflux disease   • Hemorrhoids   • Hyperlipidemia   • Leg edema   • Low back pain   • Pain, generalized   • Polyarthropathy   • Shoulder pain   • Vitamin B 12 deficiency     Advance Care Planning  Advance Directive is not on file.  ACP discussion was held with the patient during this visit. Patient has an advance directive (not in EMR), copy requested.          Objective    Vitals:    01/21/22 1319   BP: 100/70   BP Location: Right arm   Patient Position: Sitting   Cuff Size: Adult   Pulse: 73   Resp: 16   Temp: 97.8 °F (36.6 °C)   TempSrc: Infrared   SpO2: 99%   Weight: 95.8 kg (211 lb 3.2 oz)   Height: 172.7 cm (68\")   PainSc:   8   PainLoc: Generalized     BMI Readings from Last 1 Encounters:   01/21/22 32.11 kg/m²   BMI is above normal parameters. Recommendations include: educational material    Does the patient have evidence of cognitive impairment? No    Physical Exam            HEALTH RISK ASSESSMENT    Smoking Status:  Social History     Tobacco Use   Smoking Status Current Every Day Smoker   • Packs/day: 2.00   • Types: Cigarettes   Smokeless Tobacco Never Used     Alcohol Consumption:  Social History     Substance and Sexual Activity   Alcohol Use Not Currently    Comment: FORMER-QUIT 1997     Fall Risk Screen:    ROSALBA Fall Risk Assessment was completed, and patient is at LOW risk for falls.Assessment completed on:1/21/2022    Depression Screening:  PHQ-2/PHQ-9 Depression Screening 1/21/2022   Little interest or pleasure in doing things 1   Feeling down, depressed, or hopeless 1   Trouble falling or staying asleep, or sleeping too much 3   Feeling tired or having little " energy 3   Poor appetite or overeating 3   Feeling bad about yourself - or that you are a failure or have let yourself or your family down 0   Trouble concentrating on things, such as reading the newspaper or watching television 1   Moving or speaking so slowly that other people could have noticed. Or the opposite - being so fidgety or restless that you have been moving around a lot more than usual 1   Thoughts that you would be better off dead, or of hurting yourself in some way 0   Total Score 13   If you checked off any problems, how difficult have these problems made it for you to do your work, take care of things at home, or get along with other people? Not difficult at all       Health Habits and Functional and Cognitive Screening:  Functional & Cognitive Status 1/21/2022   Do you have difficulty preparing food and eating? No   Do you have difficulty bathing yourself, getting dressed or grooming yourself? No   Do you have difficulty using the toilet? No   Do you have difficulty moving around from place to place? Yes   Do you have trouble with steps or getting out of a bed or a chair? No   Current Diet Well Balanced Diet   Dental Exam Not up to date   Eye Exam Up to date   Exercise (times per week) 0 times per week   Current Exercises Include No Regular Exercise   Do you need help using the phone?  No   Are you deaf or do you have serious difficulty hearing?  No   Do you need help with transportation? No   Do you need help shopping? No   Do you need help preparing meals?  No   Do you need help with housework?  No   Do you need help with laundry? No   Do you need help taking your medications? No   Do you need help managing money? No   Do you ever drive or ride in a car without wearing a seat belt? Yes   Have you felt unusual stress, anger or loneliness in the last month? No   Who do you live with? Other   If you need help, do you have trouble finding someone available to you? No   Have you been bothered in the  last four weeks by sexual problems? No   Do you have difficulty concentrating, remembering or making decisions? No       Age-appropriate Screening Schedule:  Refer to the list below for future screening recommendations based on patient's age, sex and/or medical conditions. Orders for these recommended tests are listed in the plan section. The patient has been provided with a written plan.    Health Maintenance   Topic Date Due   • ZOSTER VACCINE (1 of 2) 01/21/2022 (Originally 10/24/2001)   • LIPID PANEL  10/07/2022   • TDAP/TD VACCINES (2 - Td or Tdap) 06/15/2026   • INFLUENZA VACCINE  Completed              Assessment/Plan   CMS Preventative Services Quick Reference  Risk Factors Identified During Encounter  Obesity/Overweight   The above risks/problems have been discussed with the patient.  Follow up actions/plans if indicated are seen below in the Assessment/Plan Section.  Pertinent information has been shared with the patient in the After Visit Summary.    Diagnoses and all orders for this visit:    1. Need for hepatitis C screening test (Primary)  -     Hepatitis C antibody; Future    2. Medicare annual wellness visit, subsequent    3. Mixed hyperlipidemia  Comments:  continue atorvastatin at current dose.    4. Benign essential HTN  Comments:  continue coreg at current dose.  Orders:  -     Comprehensive metabolic panel; Future  -     Lipid Panel w/ Chol/HDL Ratio; Future  -     Urinalysis With Culture If Indicated -; Future  -     CBC w AUTO Differential; Future    5. Vitamin B 12 deficiency  Comments:  continue vitamin B injection monthly  Orders:  -     Vitamin B12; Future    6. Gastroesophageal reflux disease without esophagitis    7. Polyarthropathy  Comments:  Since stopped oxycodone 2 weeks ago will try a few tramadol to help wean off.        Follow Up:   Return in about 6 months (around 7/21/2022).     An After Visit Summary and PPPS were made available to the patient.

## 2022-01-31 DIAGNOSIS — G62.9 NEUROPATHY: ICD-10-CM

## 2022-01-31 RX ORDER — GABAPENTIN 300 MG/1
CAPSULE ORAL
Qty: 90 CAPSULE | Refills: 2 | Status: SHIPPED | OUTPATIENT
Start: 2022-01-31 | End: 2022-05-02

## 2022-02-10 DIAGNOSIS — F41.9 ANXIETY: ICD-10-CM

## 2022-02-10 RX ORDER — ALPRAZOLAM 1 MG/1
TABLET ORAL
Qty: 90 TABLET | Refills: 0 | Status: SHIPPED | OUTPATIENT
Start: 2022-02-10 | End: 2022-03-11

## 2022-02-24 ENCOUNTER — TELEPHONE (OUTPATIENT)
Dept: FAMILY MEDICINE CLINIC | Facility: CLINIC | Age: 72
End: 2022-02-24

## 2022-02-24 DIAGNOSIS — M13.0 POLYARTHROPATHY: ICD-10-CM

## 2022-02-24 RX ORDER — TRAMADOL HYDROCHLORIDE 50 MG/1
50 TABLET ORAL EVERY 6 HOURS PRN
Qty: 60 TABLET | Refills: 0 | Status: SHIPPED | OUTPATIENT
Start: 2022-02-24 | End: 2022-07-20 | Stop reason: SDUPTHER

## 2022-02-24 NOTE — TELEPHONE ENCOUNTER
Caller: Cristóbal Garcia    Relationship: Self    Best call back number: [phone]    What is the best time to reach you: ANYTIME    Who are you requesting to speak with (clinical staff, provider,  specific staff member): NURSE OR RONI RASMUSSEN     What was the call regarding: PATIENT IS NEEDING TO TALK TO SOME REGARDING A MEDICATION     Do you require a callback: YES

## 2022-02-24 NOTE — TELEPHONE ENCOUNTER
Cristóbal called requesting some more tramadol, he didn't know if you wanted him to have anymore or not, but would like some if it's ok.

## 2022-02-28 RX ORDER — CYANOCOBALAMIN 1000 UG/ML
INJECTION, SOLUTION INTRAMUSCULAR; SUBCUTANEOUS
Qty: 1 ML | Refills: 2 | Status: SHIPPED | OUTPATIENT
Start: 2022-02-28 | End: 2022-05-27

## 2022-02-28 RX ORDER — FUROSEMIDE 20 MG/1
TABLET ORAL
Qty: 30 TABLET | Refills: 3 | Status: SHIPPED | OUTPATIENT
Start: 2022-02-28 | End: 2022-06-28

## 2022-02-28 RX ORDER — CARVEDILOL 12.5 MG/1
TABLET ORAL
Qty: 60 TABLET | Refills: 3 | Status: SHIPPED | OUTPATIENT
Start: 2022-02-28 | End: 2022-06-28

## 2022-02-28 RX ORDER — HYDROCHLOROTHIAZIDE 12.5 MG/1
CAPSULE, GELATIN COATED ORAL
Qty: 60 CAPSULE | Refills: 3 | Status: SHIPPED | OUTPATIENT
Start: 2022-02-28 | End: 2022-06-28

## 2022-03-11 DIAGNOSIS — F41.9 ANXIETY: ICD-10-CM

## 2022-03-11 RX ORDER — ALPRAZOLAM 1 MG/1
TABLET ORAL
Qty: 90 TABLET | Refills: 0 | Status: SHIPPED | OUTPATIENT
Start: 2022-03-11 | End: 2022-04-08

## 2022-04-04 RX ORDER — ASPIRIN 81 MG/1
TABLET, COATED ORAL
Qty: 30 TABLET | Refills: 3 | Status: SHIPPED | OUTPATIENT
Start: 2022-04-04 | End: 2022-07-20

## 2022-04-04 RX ORDER — FERROUS SULFATE 325(65) MG
TABLET ORAL
Qty: 30 TABLET | Refills: 3 | Status: SHIPPED | OUTPATIENT
Start: 2022-04-04 | End: 2022-07-27

## 2022-04-07 DIAGNOSIS — F41.9 ANXIETY: ICD-10-CM

## 2022-04-08 RX ORDER — ALPRAZOLAM 1 MG/1
TABLET ORAL
Qty: 90 TABLET | Refills: 0 | Status: SHIPPED | OUTPATIENT
Start: 2022-04-08 | End: 2022-05-02

## 2022-05-02 DIAGNOSIS — G62.9 NEUROPATHY: ICD-10-CM

## 2022-05-02 DIAGNOSIS — F41.9 ANXIETY: ICD-10-CM

## 2022-05-02 RX ORDER — ALPRAZOLAM 1 MG/1
TABLET ORAL
Qty: 90 TABLET | Refills: 0 | Status: SHIPPED | OUTPATIENT
Start: 2022-05-02 | End: 2022-05-27

## 2022-05-02 RX ORDER — GABAPENTIN 300 MG/1
CAPSULE ORAL
Qty: 90 CAPSULE | Refills: 2 | Status: SHIPPED | OUTPATIENT
Start: 2022-05-02 | End: 2022-07-27

## 2022-05-27 DIAGNOSIS — F41.9 ANXIETY: ICD-10-CM

## 2022-05-27 RX ORDER — ALPRAZOLAM 1 MG/1
TABLET ORAL
Qty: 90 TABLET | Refills: 0 | Status: SHIPPED | OUTPATIENT
Start: 2022-05-27 | End: 2022-06-28

## 2022-05-27 RX ORDER — CYANOCOBALAMIN 1000 UG/ML
INJECTION, SOLUTION INTRAMUSCULAR; SUBCUTANEOUS
Qty: 1 ML | Refills: 2 | Status: SHIPPED | OUTPATIENT
Start: 2022-05-27 | End: 2022-07-20 | Stop reason: SDUPTHER

## 2022-06-28 DIAGNOSIS — F41.9 ANXIETY: ICD-10-CM

## 2022-06-28 RX ORDER — HYDROCHLOROTHIAZIDE 12.5 MG/1
CAPSULE, GELATIN COATED ORAL
Qty: 60 CAPSULE | Refills: 3 | Status: SHIPPED | OUTPATIENT
Start: 2022-06-28 | End: 2022-10-13

## 2022-06-28 RX ORDER — CARVEDILOL 12.5 MG/1
TABLET ORAL
Qty: 60 TABLET | Refills: 3 | Status: SHIPPED | OUTPATIENT
Start: 2022-06-28 | End: 2022-10-13

## 2022-06-28 RX ORDER — ALPRAZOLAM 1 MG/1
TABLET ORAL
Qty: 90 TABLET | Refills: 0 | Status: SHIPPED | OUTPATIENT
Start: 2022-06-28 | End: 2022-07-27

## 2022-06-28 RX ORDER — FUROSEMIDE 20 MG/1
TABLET ORAL
Qty: 30 TABLET | Refills: 3 | Status: SHIPPED | OUTPATIENT
Start: 2022-06-28 | End: 2022-10-13

## 2022-06-29 ENCOUNTER — TELEPHONE (OUTPATIENT)
Dept: FAMILY MEDICINE CLINIC | Facility: CLINIC | Age: 72
End: 2022-06-29

## 2022-06-29 NOTE — TELEPHONE ENCOUNTER
Caller: CARINE SALDIVAR    Relationship: Brother/Sister    Best call back number: [phone]    What was the call regarding: PT REQUESTING LAB ORDERS BE SENT TO [facility]  [phone]    Do you require a callback: ONLY IF NEEDED

## 2022-07-20 ENCOUNTER — OFFICE VISIT (OUTPATIENT)
Dept: FAMILY MEDICINE CLINIC | Facility: CLINIC | Age: 72
End: 2022-07-20

## 2022-07-20 VITALS
HEART RATE: 59 BPM | OXYGEN SATURATION: 100 % | BODY MASS INDEX: 30.46 KG/M2 | DIASTOLIC BLOOD PRESSURE: 60 MMHG | HEIGHT: 68 IN | WEIGHT: 201 LBS | RESPIRATION RATE: 16 BRPM | SYSTOLIC BLOOD PRESSURE: 142 MMHG | TEMPERATURE: 97.3 F

## 2022-07-20 DIAGNOSIS — Z13.6 SCREENING FOR ABDOMINAL AORTIC ANEURYSM: Primary | ICD-10-CM

## 2022-07-20 DIAGNOSIS — I10 BENIGN ESSENTIAL HTN: ICD-10-CM

## 2022-07-20 DIAGNOSIS — K21.9 GASTROESOPHAGEAL REFLUX DISEASE WITHOUT ESOPHAGITIS: ICD-10-CM

## 2022-07-20 DIAGNOSIS — E53.8 VITAMIN B 12 DEFICIENCY: ICD-10-CM

## 2022-07-20 DIAGNOSIS — M13.0 POLYARTHROPATHY: ICD-10-CM

## 2022-07-20 DIAGNOSIS — E78.2 MIXED HYPERLIPIDEMIA: ICD-10-CM

## 2022-07-20 DIAGNOSIS — Z01.84 IMMUNITY STATUS TESTING: ICD-10-CM

## 2022-07-20 DIAGNOSIS — R09.82 POST-NASAL DRIP: ICD-10-CM

## 2022-07-20 PROCEDURE — 99214 OFFICE O/P EST MOD 30 MIN: CPT | Performed by: NURSE PRACTITIONER

## 2022-07-20 RX ORDER — NITROGLYCERIN 0.4 MG/1
TABLET SUBLINGUAL
Qty: 25 TABLET | Refills: 0 | Status: SHIPPED | OUTPATIENT
Start: 2022-07-20 | End: 2022-12-12

## 2022-07-20 RX ORDER — CYANOCOBALAMIN 1000 UG/ML
1000 INJECTION, SOLUTION INTRAMUSCULAR; SUBCUTANEOUS
Qty: 1 ML | Refills: 2 | Status: SHIPPED | OUTPATIENT
Start: 2022-07-20 | End: 2022-12-08

## 2022-07-20 RX ORDER — LORATADINE 10 MG/1
10 TABLET ORAL DAILY
Qty: 90 TABLET | Refills: 3 | Status: SHIPPED | OUTPATIENT
Start: 2022-07-20 | End: 2023-01-20 | Stop reason: SDUPTHER

## 2022-07-20 RX ORDER — ASPIRIN 325 MG
325 TABLET ORAL DAILY
COMMUNITY
End: 2022-07-27

## 2022-07-20 RX ORDER — TRAMADOL HYDROCHLORIDE 50 MG/1
50 TABLET ORAL EVERY 6 HOURS PRN
Qty: 120 TABLET | Refills: 0 | Status: CANCELLED | OUTPATIENT
Start: 2022-07-20

## 2022-07-20 RX ORDER — ATORVASTATIN CALCIUM 40 MG/1
40 TABLET, FILM COATED ORAL DAILY
Qty: 90 TABLET | Refills: 1 | Status: SHIPPED | OUTPATIENT
Start: 2022-07-20 | End: 2023-01-20 | Stop reason: SDUPTHER

## 2022-07-20 RX ORDER — TRAMADOL HYDROCHLORIDE 50 MG/1
50 TABLET ORAL EVERY 6 HOURS PRN
Qty: 120 TABLET | Refills: 1 | Status: SHIPPED | OUTPATIENT
Start: 2022-07-20 | End: 2022-09-16

## 2022-07-20 NOTE — PROGRESS NOTES
Chief Complaint  Hypertension, Hyperlipidemia, vitamin b12 deficency, Anemia, Back Pain, Allergies, and Anxiety    Subjective        Cristóbal Garcia presents to [facility]  Anxiety:  Has increased.      Hypertension:  Doing well on medication and well controlled.  142/60.    Impaired fasting glucose at home running 120 in the am.  The other day was 181.  Has been eating snacks when wakes up at 3 am and drinking sweet tea.    Hyperlipidemia:  Doing well on medication.    Sinus infection for a while and nose is getting sore and drainage. Is usually clear but at times will have foul odor at times.    Anemia:  Doing well on medication.    Back pain :  Hurting a little since stopped pain medication.  All over arthralgia  Unsure if helped with tramadol.      Vitamin b deficiency.  Doing well on medication.    Hypertension  Associated symptoms include anxiety. Pertinent negatives include no chest pain, palpitations or shortness of breath.   Hyperlipidemia  Pertinent negatives include no chest pain or shortness of breath.   Anemia  There has been no fever or palpitations.   Back Pain  Pertinent negatives include no chest pain, fever, numbness or weakness.   Allergies  Pertinent negatives include no chest pain, coughing, fever, numbness or weakness.   Anxiety  Patient reports no chest pain, dizziness, palpitations or shortness of breath.     His past medical history is significant for anemia.         Past History:    Medical History: has a past medical history of Acid reflux disease, Alcoholism ([facility]), Anxiety, Arm pain, Arthritis, Cataracts, bilateral, Cervical spine pain, Gastric reflux, Generalized pain, HBP (high blood pressure), Hemorrhoids, High cholesterol, Joint pain, Leg pain, Low back pain (06/11/2012), Lumbar pain, Lumbar radiculitis (06/11/2012), Night sweats, Osteoarthritis (06/11/2012), Other chronic pain, and Shoulder pain.     Surgical History: has a past surgical history that  includes Appendectomy; Cataract extraction; Cholecystectomy; and Shoulder surgery.     Family History: family history includes Cancer in his sister and other family members; Cancer (age of onset: 80) in his mother; Heart disease in his brother and another family member; Stroke in his father.     Social History: reports that he has been smoking cigarettes. He has been smoking about 2.00 packs per day. He has never used smokeless tobacco. He reports previous alcohol use. He reports that he does not use drugs.    Allergies: Patient has no known allergies.          Current Outpatient Medications:   •  ALPRAZolam (XANAX) 1 MG tablet, TAKE ONE TABLET BY MOUTH THREE TIMES A DAY AS NEEDED FOR ANXIETY - MAY CAUSE DROWSINESS, Disp: 90 tablet, Rfl: 0  •  atorvastatin (LIPITOR) 40 MG tablet, Take 1 tablet by mouth Daily., Disp: 90 tablet, Rfl: 1  •  carvedilol (COREG) 12.5 MG tablet, TAKE ONE TABLET BY MOUTH TWO TIMES A DAY WITH FOOD, Disp: 60 tablet, Rfl: 3  •  cyanocobalamin 1000 MCG/ML injection, Inject 1 mL into the appropriate muscle as directed by prescriber Every 30 (Thirty) Days., Disp: 1 mL, Rfl: 2  •  FeroSul 325 (65 Fe) MG tablet, TAKE ONE TABLET BY MOUTH ONCE DAILY, Disp: 30 tablet, Rfl: 3  •  furosemide (LASIX) 20 MG tablet, TAKE ONE TABLET BY MOUTH EVERY DAY, Disp: 30 tablet, Rfl: 3  •  gabapentin (NEURONTIN) 300 MG capsule, TAKE ONE CAPSULE BY MOUTH THREE TIMES A DAY, Disp: 90 capsule, Rfl: 2  •  hydroCHLOROthiazide (MICROZIDE) 12.5 MG capsule, TAKE ONE CAPSULE BY MOUTH TWO TIMES A DAY, Disp: 60 capsule, Rfl: 3  •  nitroglycerin (NITROSTAT) 0.4 MG SL tablet, PLACE 1 TAB UNDER TONGUE FOR CHEST PAIN, MAY REPEAT EVERY 5 MINUTES UP TO 3 TABS THEN CALL 911/GO E.R., Disp: , Rfl:   •  traMADol (ULTRAM) 50 MG tablet, Take 1 tablet by mouth Every 6 (Six) Hours As Needed for Moderate Pain ., Disp: 120 tablet, Rfl: 1  •  aspirin 325 MG tablet, Take 325 mg by mouth Daily., Disp: , Rfl:   •  diphenhydrAMINE (BENADRYL) 50 MG  "capsule, NightTime Sleep Aid (diphen) 50 mg oral capsule take 1-2 capsules by oral route daily for 30 days   Active, Disp: , Rfl:   •  loratadine (Claritin) 10 MG tablet, Take 1 tablet by mouth Daily., Disp: 90 tablet, Rfl: 3  •  ondansetron (ZOFRAN) 4 MG tablet, TAKE ONE TABLET BY MOUTH EVERY 8 HOURS, Disp: 20 tablet, Rfl: 2    Medications Discontinued During This Encounter   Medication Reason   • Aspirin Low Dose 81 MG EC tablet *Therapy completed   • lactulose (CHRONULAC) 10 GM/15ML solution *Therapy completed   • naloxone (NARCAN) 4 MG/0.1ML nasal spray *Therapy completed   • atorvastatin (LIPITOR) 40 MG tablet Reorder   • cyanocobalamin 1000 MCG/ML injection Reorder   • traMADol (ULTRAM) 50 MG tablet Reorder         Review of Systems   Constitutional: Negative for fever.   Respiratory: Negative for cough and shortness of breath.    Cardiovascular: Negative for chest pain, palpitations and leg swelling.   Musculoskeletal: Positive for back pain.   Neurological: Negative for dizziness, weakness, numbness and headache.        Objective         Vitals:    07/20/22 1048   BP: 142/60   BP Location: Left arm   Patient Position: Sitting   Cuff Size: Adult   Pulse: 59   Resp: 16   Temp: 97.3 °F (36.3 °C)   TempSrc: Infrared   SpO2: 100%   Weight: 91.2 kg (201 lb)   Height: 172.7 cm (67.99\")     Body mass index is 30.57 kg/m².         Physical Exam  Vitals reviewed.   Constitutional:       Appearance: Normal appearance. He is well-developed.   HENT:      Head: Normocephalic and atraumatic.      Mouth/Throat:      Pharynx: No oropharyngeal exudate.   Eyes:      Conjunctiva/sclera: Conjunctivae normal.      Pupils: Pupils are equal, round, and reactive to light.   Cardiovascular:      Rate and Rhythm: Normal rate and regular rhythm.      Heart sounds: Normal heart sounds. No murmur heard.    No friction rub. No gallop.   Pulmonary:      Effort: Pulmonary effort is normal.      Breath sounds: Normal breath sounds. No " wheezing or rhonchi.   Skin:     General: Skin is warm and dry.   Neurological:      Mental Status: He is alert and oriented to person, place, and time.   Psychiatric:         Mood and Affect: Mood and affect normal.         Behavior: Behavior normal.         Thought Content: Thought content normal.         Judgment: Judgment normal.             Result Review :               Assessment and Plan     Diagnoses and all orders for this visit:    1. Screening for abdominal aortic aneurysm (Primary)  -     US AAA Screen Limited; Future    2. Polyarthropathy  Comments:  Not sure if tramadol  helped because coming off medication willing to try again.  Orders:  -     traMADol (ULTRAM) 50 MG tablet; Take 1 tablet by mouth Every 6 (Six) Hours As Needed for Moderate Pain .  Dispense: 120 tablet; Refill: 1    3. Benign essential HTN  -     Comprehensive Metabolic Panel; Future  -     Lipid Panel With / Chol / HDL Ratio; Future  -     Urinalysis With Culture If Indicated -; Future  -     CBC & Differential; Future    4. Gastroesophageal reflux disease without esophagitis    5. Vitamin B 12 deficiency  -     cyanocobalamin 1000 MCG/ML injection; Inject 1 mL into the appropriate muscle as directed by prescriber Every 30 (Thirty) Days.  Dispense: 1 mL; Refill: 2  -     Vitamin B12; Future    6. Mixed hyperlipidemia  -     atorvastatin (LIPITOR) 40 MG tablet; Take 1 tablet by mouth Daily.  Dispense: 90 tablet; Refill: 1    7. Immunity status testing  -     Varicella zoster antibody, IgG; Future    8. Post-nasal drip  -     loratadine (Claritin) 10 MG tablet; Take 1 tablet by mouth Daily.  Dispense: 90 tablet; Refill: 3    9. Polyarthropathy  Comments:  Since stopped oxycodone 2 weeks ago will try a few tramadol to help wean off.  Orders:  -     traMADol (ULTRAM) 50 MG tablet; Take 1 tablet by mouth Every 6 (Six) Hours As Needed for Moderate Pain .  Dispense: 120 tablet; Refill: 1              Follow Up     Return in about 6 months  (around 1/20/2023).    Patient was given instructions and counseling regarding his condition or for health maintenance advice. Please see specific information pulled into the AVS if appropriate.

## 2022-07-26 DIAGNOSIS — G62.9 NEUROPATHY: ICD-10-CM

## 2022-07-26 DIAGNOSIS — F41.9 ANXIETY: ICD-10-CM

## 2022-07-27 RX ORDER — ASPIRIN 81 MG/1
TABLET, COATED ORAL
Qty: 30 TABLET | Refills: 3 | Status: SHIPPED | OUTPATIENT
Start: 2022-07-27 | End: 2022-11-09

## 2022-07-27 RX ORDER — GABAPENTIN 300 MG/1
CAPSULE ORAL
Qty: 90 CAPSULE | Refills: 2 | Status: SHIPPED | OUTPATIENT
Start: 2022-07-27 | End: 2022-10-20

## 2022-07-27 RX ORDER — ALPRAZOLAM 1 MG/1
TABLET ORAL
Qty: 90 TABLET | Refills: 0 | Status: SHIPPED | OUTPATIENT
Start: 2022-07-27 | End: 2022-08-24

## 2022-07-27 RX ORDER — FERROUS SULFATE 325(65) MG
TABLET ORAL
Qty: 30 TABLET | Refills: 3 | Status: SHIPPED | OUTPATIENT
Start: 2022-07-27 | End: 2022-11-09

## 2022-08-03 ENCOUNTER — TELEPHONE (OUTPATIENT)
Dept: FAMILY MEDICINE CLINIC | Facility: CLINIC | Age: 72
End: 2022-08-03

## 2022-08-03 DIAGNOSIS — M13.0 POLYARTHROPATHY: Primary | ICD-10-CM

## 2022-08-03 RX ORDER — CELECOXIB 200 MG/1
200 CAPSULE ORAL DAILY
Qty: 90 CAPSULE | Refills: 1 | Status: SHIPPED | OUTPATIENT
Start: 2022-08-03

## 2022-08-23 DIAGNOSIS — F41.9 ANXIETY: ICD-10-CM

## 2022-08-24 RX ORDER — ALPRAZOLAM 1 MG/1
TABLET ORAL
Qty: 90 TABLET | Refills: 0 | Status: SHIPPED | OUTPATIENT
Start: 2022-08-24 | End: 2022-09-19

## 2022-09-16 DIAGNOSIS — M13.0 POLYARTHROPATHY: ICD-10-CM

## 2022-09-16 RX ORDER — TRAMADOL HYDROCHLORIDE 50 MG/1
TABLET ORAL
Qty: 120 TABLET | Refills: 1 | Status: SHIPPED | OUTPATIENT
Start: 2022-09-16 | End: 2022-11-09

## 2022-09-19 DIAGNOSIS — F41.9 ANXIETY: ICD-10-CM

## 2022-09-19 RX ORDER — ALPRAZOLAM 1 MG/1
TABLET ORAL
Qty: 90 TABLET | Refills: 0 | Status: SHIPPED | OUTPATIENT
Start: 2022-09-19 | End: 2022-10-20

## 2022-10-13 RX ORDER — HYDROCHLOROTHIAZIDE 12.5 MG/1
CAPSULE, GELATIN COATED ORAL
Qty: 60 CAPSULE | Refills: 3 | Status: SHIPPED | OUTPATIENT
Start: 2022-10-13 | End: 2023-01-30

## 2022-10-13 RX ORDER — FUROSEMIDE 20 MG/1
TABLET ORAL
Qty: 30 TABLET | Refills: 3 | Status: SHIPPED | OUTPATIENT
Start: 2022-10-13 | End: 2023-01-30

## 2022-10-13 RX ORDER — CARVEDILOL 12.5 MG/1
TABLET ORAL
Qty: 60 TABLET | Refills: 3 | Status: SHIPPED | OUTPATIENT
Start: 2022-10-13 | End: 2023-01-30

## 2022-10-20 DIAGNOSIS — G62.9 NEUROPATHY: ICD-10-CM

## 2022-10-20 DIAGNOSIS — F41.9 ANXIETY: ICD-10-CM

## 2022-10-20 RX ORDER — GABAPENTIN 300 MG/1
CAPSULE ORAL
Qty: 90 CAPSULE | Refills: 0 | Status: SHIPPED | OUTPATIENT
Start: 2022-10-20 | End: 2022-11-16

## 2022-10-20 RX ORDER — ALPRAZOLAM 1 MG/1
TABLET ORAL
Qty: 90 TABLET | Refills: 0 | Status: SHIPPED | OUTPATIENT
Start: 2022-10-20 | End: 2022-11-16

## 2022-11-08 DIAGNOSIS — M13.0 POLYARTHROPATHY: ICD-10-CM

## 2022-11-09 RX ORDER — ASPIRIN 81 MG/1
TABLET, COATED ORAL
Qty: 30 TABLET | Refills: 3 | Status: SHIPPED | OUTPATIENT
Start: 2022-11-09 | End: 2023-02-23

## 2022-11-09 RX ORDER — FERROUS SULFATE 325(65) MG
TABLET ORAL
Qty: 30 TABLET | Refills: 3 | Status: SHIPPED | OUTPATIENT
Start: 2022-11-09

## 2022-11-09 RX ORDER — TRAMADOL HYDROCHLORIDE 50 MG/1
TABLET ORAL
Qty: 120 TABLET | Refills: 1 | Status: SHIPPED | OUTPATIENT
Start: 2022-11-09 | End: 2023-01-04

## 2022-11-15 DIAGNOSIS — F41.9 ANXIETY: ICD-10-CM

## 2022-11-15 DIAGNOSIS — G62.9 NEUROPATHY: ICD-10-CM

## 2022-11-16 RX ORDER — ALPRAZOLAM 1 MG/1
TABLET ORAL
Qty: 90 TABLET | Refills: 0 | Status: SHIPPED | OUTPATIENT
Start: 2022-11-16 | End: 2022-12-12

## 2022-11-16 RX ORDER — GABAPENTIN 300 MG/1
CAPSULE ORAL
Qty: 90 CAPSULE | Refills: 0 | Status: SHIPPED | OUTPATIENT
Start: 2022-11-16 | End: 2022-12-14

## 2022-12-08 DIAGNOSIS — E53.8 VITAMIN B 12 DEFICIENCY: ICD-10-CM

## 2022-12-08 RX ORDER — CYANOCOBALAMIN 1000 UG/ML
INJECTION, SOLUTION INTRAMUSCULAR; SUBCUTANEOUS
Qty: 1 ML | Refills: 2 | Status: SHIPPED | OUTPATIENT
Start: 2022-12-08 | End: 2023-02-23

## 2022-12-12 DIAGNOSIS — F41.9 ANXIETY: ICD-10-CM

## 2022-12-12 RX ORDER — ALPRAZOLAM 1 MG/1
TABLET ORAL
Qty: 90 TABLET | Refills: 0 | Status: SHIPPED | OUTPATIENT
Start: 2022-12-12 | End: 2023-01-11

## 2022-12-12 RX ORDER — NITROGLYCERIN 0.4 MG/1
TABLET SUBLINGUAL
Qty: 25 TABLET | Refills: 0 | Status: SHIPPED | OUTPATIENT
Start: 2022-12-12

## 2022-12-13 DIAGNOSIS — G62.9 NEUROPATHY: ICD-10-CM

## 2022-12-14 RX ORDER — GABAPENTIN 300 MG/1
CAPSULE ORAL
Qty: 90 CAPSULE | Refills: 0 | Status: SHIPPED | OUTPATIENT
Start: 2022-12-14 | End: 2023-01-11

## 2023-01-03 DIAGNOSIS — M13.0 POLYARTHROPATHY: ICD-10-CM

## 2023-01-04 RX ORDER — TRAMADOL HYDROCHLORIDE 50 MG/1
TABLET ORAL
Qty: 120 TABLET | Refills: 1 | Status: SHIPPED | OUTPATIENT
Start: 2023-01-04 | End: 2023-02-23

## 2023-01-10 DIAGNOSIS — F41.9 ANXIETY: ICD-10-CM

## 2023-01-10 DIAGNOSIS — G62.9 NEUROPATHY: ICD-10-CM

## 2023-01-11 RX ORDER — GABAPENTIN 300 MG/1
CAPSULE ORAL
Qty: 90 CAPSULE | Refills: 0 | Status: SHIPPED | OUTPATIENT
Start: 2023-01-11 | End: 2023-02-08

## 2023-01-11 RX ORDER — ALPRAZOLAM 1 MG/1
TABLET ORAL
Qty: 90 TABLET | Refills: 0 | Status: SHIPPED | OUTPATIENT
Start: 2023-01-11 | End: 2023-02-08

## 2023-01-20 ENCOUNTER — OFFICE VISIT (OUTPATIENT)
Dept: FAMILY MEDICINE CLINIC | Facility: CLINIC | Age: 73
End: 2023-01-20
Payer: MEDICARE

## 2023-01-20 VITALS
TEMPERATURE: 96.7 F | HEART RATE: 79 BPM | WEIGHT: 200.2 LBS | SYSTOLIC BLOOD PRESSURE: 130 MMHG | DIASTOLIC BLOOD PRESSURE: 76 MMHG | HEIGHT: 68 IN | BODY MASS INDEX: 30.34 KG/M2

## 2023-01-20 DIAGNOSIS — Z23 NEED FOR INFLUENZA VACCINATION: Primary | ICD-10-CM

## 2023-01-20 DIAGNOSIS — R09.82 POST-NASAL DRIP: ICD-10-CM

## 2023-01-20 DIAGNOSIS — Z23 NEED FOR SHINGLES VACCINE: ICD-10-CM

## 2023-01-20 DIAGNOSIS — J01.90 ACUTE NON-RECURRENT SINUSITIS, UNSPECIFIED LOCATION: ICD-10-CM

## 2023-01-20 DIAGNOSIS — F41.1 GENERALIZED ANXIETY DISORDER: ICD-10-CM

## 2023-01-20 DIAGNOSIS — M51.36 DEGENERATION OF LUMBAR INTERVERTEBRAL DISC: ICD-10-CM

## 2023-01-20 DIAGNOSIS — R73.01 IMPAIRED FASTING GLUCOSE: ICD-10-CM

## 2023-01-20 DIAGNOSIS — D50.9 IRON DEFICIENCY ANEMIA, UNSPECIFIED IRON DEFICIENCY ANEMIA TYPE: ICD-10-CM

## 2023-01-20 DIAGNOSIS — Z51.81 MEDICATION MONITORING ENCOUNTER: ICD-10-CM

## 2023-01-20 DIAGNOSIS — E78.2 MIXED HYPERLIPIDEMIA: ICD-10-CM

## 2023-01-20 DIAGNOSIS — E53.8 VITAMIN B 12 DEFICIENCY: ICD-10-CM

## 2023-01-20 PROCEDURE — 80305 DRUG TEST PRSMV DIR OPT OBS: CPT | Performed by: NURSE PRACTITIONER

## 2023-01-20 PROCEDURE — G0008 ADMIN INFLUENZA VIRUS VAC: HCPCS | Performed by: NURSE PRACTITIONER

## 2023-01-20 PROCEDURE — 90662 IIV NO PRSV INCREASED AG IM: CPT | Performed by: NURSE PRACTITIONER

## 2023-01-20 PROCEDURE — 99214 OFFICE O/P EST MOD 30 MIN: CPT | Performed by: NURSE PRACTITIONER

## 2023-01-20 RX ORDER — LORATADINE 10 MG/1
10 TABLET ORAL DAILY
Qty: 90 TABLET | Refills: 3 | Status: SHIPPED | OUTPATIENT
Start: 2023-01-20

## 2023-01-20 RX ORDER — OFLOXACIN 3 MG/ML
SOLUTION/ DROPS OPHTHALMIC
COMMUNITY
Start: 2023-01-03

## 2023-01-20 RX ORDER — AMOXICILLIN AND CLAVULANATE POTASSIUM 875; 125 MG/1; MG/1
1 TABLET, FILM COATED ORAL 2 TIMES DAILY
Qty: 20 TABLET | Refills: 0 | Status: SHIPPED | OUTPATIENT
Start: 2023-01-20

## 2023-01-20 RX ORDER — ATORVASTATIN CALCIUM 40 MG/1
40 TABLET, FILM COATED ORAL DAILY
Qty: 90 TABLET | Refills: 1 | Status: SHIPPED | OUTPATIENT
Start: 2023-01-20

## 2023-01-20 NOTE — PROGRESS NOTES
Chief Complaint  Hypertension, Hyperlipidemia, vitamin b12 def, Anemia, Allergies, and Anxiety    Subjective        Cristóbal Garcia presents to [facility]  History of Present Illness  Anxiety:  Continues on medication  And still working well.    Hypertension:  Doing well on medication and well controlled.  130/76.    Impaired fasting glucose at home running 120 in the am.  The other day was 181.  Has been eating snacks when wakes up at 3 am and drinking sweet tea.    Hyperlipidemia:  Doing well on medication.    States afraid to sleep due o room mate being sick so only getting about 4 hours a night.    Sinus infection for a while and nose is getting sore and drainage. Is usually clear but at times will have foul odor at times.    Anemia:  Doing well on medication.    Back pain :  Hurting a little since stopped pain medication.  All over arthralgia  Unsure if helped with tramadol.  Still having pain but doesn't want anything else and doesn't want to go to pain management.    Vitamin b deficiency.  Doing well on medication.  Hypertension  Associated symptoms include anxiety. Pertinent negatives include no chest pain, palpitations or shortness of breath.   Hyperlipidemia  Pertinent negatives include no chest pain or shortness of breath.   Anemia  There has been no fever or palpitations.   Allergies  Pertinent negatives include no chest pain, coughing, fever, numbness or weakness.   Anxiety  Patient reports no chest pain, palpitations or shortness of breath.     His past medical history is significant for anemia.   Back Pain  Pertinent negatives include no chest pain, fever, numbness or weakness.         Past History:    Medical History: has a past medical history of Acid reflux disease, Alcoholism (HCC), Anxiety, Arm pain, Arthritis, Cataracts, bilateral, Cervical spine pain, Gastric reflux, Generalized pain, HBP (high blood pressure), Hemorrhoids, High cholesterol, Joint pain, Leg pain,  Low back pain (06/11/2012), Lumbar pain, Lumbar radiculitis (06/11/2012), Night sweats, Osteoarthritis (06/11/2012), Other chronic pain, and Shoulder pain.     Surgical History: has a past surgical history that includes Appendectomy; Cataract extraction; Cholecystectomy; and Shoulder surgery.     Family History: family history includes Cancer in his sister and other family members; Cancer (age of onset: 80) in his mother; Heart disease in his brother and another family member; Stroke in his father.     Social History: reports that he has been smoking cigarettes. He has been smoking an average of 2 packs per day. He has never used smokeless tobacco. He reports that he does not currently use alcohol. He reports that he does not use drugs.    Allergies: Patient has no known allergies.          Current Outpatient Medications:   •  ALPRAZolam (XANAX) 1 MG tablet, TAKE ONE TABLET BY MOUTH THREE TIMES A DAY AS NEEDED FOR ANXIETY, Disp: 90 tablet, Rfl: 0  •  Aspirin Low Dose 81 MG EC tablet, TAKE ONE TABLET BY MOUTH EVERY DAY, Disp: 30 tablet, Rfl: 3  •  atorvastatin (LIPITOR) 40 MG tablet, Take 1 tablet by mouth Daily., Disp: 90 tablet, Rfl: 1  •  carvedilol (COREG) 12.5 MG tablet, TAKE ONE TABLET BY MOUTH TWO TIMES A DAY WITH FOOD, Disp: 60 tablet, Rfl: 3  •  celecoxib (CeleBREX) 200 MG capsule, Take 1 capsule by mouth Daily. (Patient taking differently: Take 200 mg by mouth Every Other Day.), Disp: 90 capsule, Rfl: 1  •  cyanocobalamin 1000 MCG/ML injection, INJECT 1 ML INTO THE APPROPRIATE MUSCLE AS DIRECTED BY PRESCRIBER EVERY 30 DAYS., Disp: 1 mL, Rfl: 2  •  FeroSul 325 (65 Fe) MG tablet, TAKE ONE TABLET BY MOUTH EVERY DAY, Disp: 30 tablet, Rfl: 3  •  furosemide (LASIX) 20 MG tablet, TAKE ONE TABLET BY MOUTH EVERY DAY, Disp: 30 tablet, Rfl: 3  •  gabapentin (NEURONTIN) 300 MG capsule, TAKE ONE CAPSULE THREE TIMES PER DAY, Disp: 90 capsule, Rfl: 0  •  hydroCHLOROthiazide (MICROZIDE) 12.5 MG capsule, TAKE ONE CAPSULE BY  "MOUTH TWO TIMES A DAY, Disp: 60 capsule, Rfl: 3  •  loratadine (Claritin) 10 MG tablet, Take 1 tablet by mouth Daily., Disp: 90 tablet, Rfl: 3  •  nitroglycerin (NITROSTAT) 0.4 MG SL tablet, PLACE 1 TAB UNDER TONGUE FOR CHEST PAIN, MAY REPEAT EVERY 5 MINUTES UP TO 3 TABS THEN CALL 911/GO E.R., Disp: 25 tablet, Rfl: 0  •  ofloxacin (OCUFLOX) 0.3 % ophthalmic solution, PLACE 1 DROP INTO DIRECTED EYE FOUR TIMES PER DAY FOR 4 DAYS BEFORE AND 4 DAYS AFTER, Disp: , Rfl:   •  traMADol (ULTRAM) 50 MG tablet, TAKE ONE TABLET BY MOUTH EVERY 6 HOURS AS NEEDED FOR MODERATE PAIN - MAY CAUSE DROWSINESS, Disp: 120 tablet, Rfl: 1  •  amoxicillin-clavulanate (Augmentin) 875-125 MG per tablet, Take 1 tablet by mouth 2 (Two) Times a Day., Disp: 20 tablet, Rfl: 0  •  ondansetron (ZOFRAN) 4 MG tablet, TAKE ONE TABLET BY MOUTH EVERY 8 HOURS, Disp: 20 tablet, Rfl: 2  •  Zoster Vac Recomb Adjuvanted 50 MCG/0.5ML reconstituted suspension, Inject 0.5 mL into the appropriate muscle as directed by prescriber 1 (One) Time for 1 dose., Disp: 1 each, Rfl: 1    Medications Discontinued During This Encounter   Medication Reason   • diphenhydrAMINE (BENADRYL) 50 MG capsule *Therapy completed   • atorvastatin (LIPITOR) 40 MG tablet Reorder   • loratadine (Claritin) 10 MG tablet Reorder         Review of Systems   Constitutional: Negative for fever.   Respiratory: Negative for cough and shortness of breath.    Cardiovascular: Negative for chest pain and palpitations.   Musculoskeletal: Positive for back pain.   Neurological: Negative for weakness and numbness.        Objective         Vitals:    01/20/23 1057   BP: 130/76   BP Location: Right arm   Patient Position: Sitting   Cuff Size: Adult   Pulse: 79   Temp: 96.7 °F (35.9 °C)   TempSrc: Temporal   Weight: 90.8 kg (200 lb 3.2 oz)   Height: 172.7 cm (67.99\")     Body mass index is 30.45 kg/m².         Physical Exam  Vitals reviewed.   Constitutional:       Appearance: Normal appearance. He is " well-developed.   HENT:      Head: Normocephalic and atraumatic.      Right Ear: Tympanic membrane normal.      Left Ear: Tympanic membrane normal.      Nose: Nose normal.      Mouth/Throat:      Pharynx: No oropharyngeal exudate.   Eyes:      Conjunctiva/sclera: Conjunctivae normal.      Pupils: Pupils are equal, round, and reactive to light.   Cardiovascular:      Rate and Rhythm: Normal rate and regular rhythm.      Heart sounds: Normal heart sounds. No murmur heard.    No friction rub. No gallop.   Pulmonary:      Effort: Pulmonary effort is normal.      Breath sounds: Normal breath sounds. No wheezing or rhonchi.   Skin:     General: Skin is warm and dry.   Neurological:      Mental Status: He is alert and oriented to person, place, and time.   Psychiatric:         Mood and Affect: Mood and affect normal.         Behavior: Behavior normal.         Thought Content: Thought content normal.         Judgment: Judgment normal.             Result Review :               Assessment and Plan     Diagnoses and all orders for this visit:    1. Need for influenza vaccination (Primary)  -     Fluzone High-Dose 65+yrs (4862-5124)    2. Medication monitoring encounter  -     POC Urine Drug Screen [facility]    3. Mixed hyperlipidemia  -     Comprehensive Metabolic Panel; Future  -     Lipid Panel; Future  -     atorvastatin (LIPITOR) 40 MG tablet; Take 1 tablet by mouth Daily.  Dispense: 90 tablet; Refill: 1    4. Vitamin B 12 deficiency  -     Cancel: Vitamin B12; Future  -     Vitamin B12; Future    5. Impaired fasting glucose  -     Hemoglobin A1c; Future    6. Degeneration of lumbar intervertebral disc    7. Iron deficiency anemia, unspecified iron deficiency anemia type  -     CBC & Differential; Future  -     Urinalysis With Culture If Indicated -; Future    8. Acute non-recurrent sinusitis, unspecified location  -     amoxicillin-clavulanate (Augmentin) 875-125 MG per tablet; Take 1 tablet by mouth 2 (Two) Times a  Day.  Dispense: 20 tablet; Refill: 0    9. Need for shingles vaccine  -     Zoster Vac Recomb Adjuvanted 50 MCG/0.5ML reconstituted suspension; Inject 0.5 mL into the appropriate muscle as directed by prescriber 1 (One) Time for 1 dose.  Dispense: 1 each; Refill: 1    10. Generalized anxiety disorder    11. Post-nasal drip  -     loratadine (Claritin) 10 MG tablet; Take 1 tablet by mouth Daily.  Dispense: 90 tablet; Refill: 3              Follow Up     Return in about 6 months (around 7/20/2023).    Patient was given instructions and counseling regarding his condition or for health maintenance advice. Please see specific information pulled into the AVS if appropriate.

## 2023-01-26 ENCOUNTER — TELEPHONE (OUTPATIENT)
Dept: FAMILY MEDICINE CLINIC | Facility: CLINIC | Age: 73
End: 2023-01-26
Payer: MEDICARE

## 2023-01-26 DIAGNOSIS — Z01.89 ROUTINE LAB DRAW: ICD-10-CM

## 2023-01-26 DIAGNOSIS — E53.8 VITAMIN B 12 DEFICIENCY: Primary | ICD-10-CM

## 2023-01-26 DIAGNOSIS — R73.01 IMPAIRED FASTING GLUCOSE: ICD-10-CM

## 2023-01-26 DIAGNOSIS — I10 BENIGN ESSENTIAL HTN: ICD-10-CM

## 2023-01-26 DIAGNOSIS — E53.8 B12 DEFICIENCY: ICD-10-CM

## 2023-01-26 DIAGNOSIS — E78.5 HYPERLIPIDEMIA, UNSPECIFIED HYPERLIPIDEMIA TYPE: ICD-10-CM

## 2023-01-30 RX ORDER — FUROSEMIDE 20 MG/1
TABLET ORAL
Qty: 30 TABLET | Refills: 3 | Status: SHIPPED | OUTPATIENT
Start: 2023-01-30

## 2023-01-30 RX ORDER — CARVEDILOL 12.5 MG/1
TABLET ORAL
Qty: 60 TABLET | Refills: 3 | Status: SHIPPED | OUTPATIENT
Start: 2023-01-30

## 2023-01-30 RX ORDER — HYDROCHLOROTHIAZIDE 12.5 MG/1
CAPSULE, GELATIN COATED ORAL
Qty: 60 CAPSULE | Refills: 3 | Status: SHIPPED | OUTPATIENT
Start: 2023-01-30

## 2023-02-07 DIAGNOSIS — G62.9 NEUROPATHY: ICD-10-CM

## 2023-02-07 DIAGNOSIS — F41.9 ANXIETY: ICD-10-CM

## 2023-02-08 RX ORDER — GABAPENTIN 300 MG/1
CAPSULE ORAL
Qty: 90 CAPSULE | Refills: 0 | Status: SHIPPED | OUTPATIENT
Start: 2023-02-08 | End: 2023-03-06

## 2023-02-08 RX ORDER — ALPRAZOLAM 1 MG/1
TABLET ORAL
Qty: 90 TABLET | Refills: 0 | Status: SHIPPED | OUTPATIENT
Start: 2023-02-08 | End: 2023-03-06

## 2023-02-23 DIAGNOSIS — M13.0 POLYARTHROPATHY: ICD-10-CM

## 2023-02-23 DIAGNOSIS — E53.8 VITAMIN B 12 DEFICIENCY: ICD-10-CM

## 2023-02-23 RX ORDER — TRAMADOL HYDROCHLORIDE 50 MG/1
TABLET ORAL
Qty: 120 TABLET | Refills: 0 | Status: SHIPPED | OUTPATIENT
Start: 2023-02-23 | End: 2023-03-23

## 2023-02-23 RX ORDER — ASPIRIN 81 MG/1
TABLET, COATED ORAL
Qty: 30 TABLET | Refills: 3 | Status: SHIPPED | OUTPATIENT
Start: 2023-02-23

## 2023-02-23 RX ORDER — CYANOCOBALAMIN 1000 UG/ML
INJECTION, SOLUTION INTRAMUSCULAR; SUBCUTANEOUS
Qty: 1 ML | Refills: 2 | Status: SHIPPED | OUTPATIENT
Start: 2023-02-23

## 2023-03-06 DIAGNOSIS — G62.9 NEUROPATHY: ICD-10-CM

## 2023-03-06 DIAGNOSIS — F41.9 ANXIETY: ICD-10-CM

## 2023-03-06 RX ORDER — GABAPENTIN 300 MG/1
CAPSULE ORAL
Qty: 90 CAPSULE | Refills: 0 | Status: SHIPPED | OUTPATIENT
Start: 2023-03-06

## 2023-03-06 RX ORDER — ALPRAZOLAM 1 MG/1
TABLET ORAL
Qty: 90 TABLET | Refills: 0 | Status: SHIPPED | OUTPATIENT
Start: 2023-03-06 | End: 2023-04-03

## 2023-03-23 DIAGNOSIS — M13.0 POLYARTHROPATHY: ICD-10-CM

## 2023-03-23 RX ORDER — TRAMADOL HYDROCHLORIDE 50 MG/1
TABLET ORAL
Qty: 120 TABLET | Refills: 0 | Status: SHIPPED | OUTPATIENT
Start: 2023-03-23

## 2023-04-03 DIAGNOSIS — F41.9 ANXIETY: ICD-10-CM

## 2023-04-03 RX ORDER — ALPRAZOLAM 1 MG/1
TABLET ORAL
Qty: 90 TABLET | Refills: 0 | Status: SHIPPED | OUTPATIENT
Start: 2023-04-03

## 2023-04-11 DIAGNOSIS — G62.9 NEUROPATHY: ICD-10-CM

## 2023-04-12 RX ORDER — GABAPENTIN 300 MG/1
CAPSULE ORAL
Qty: 90 CAPSULE | Refills: 0 | Status: SHIPPED | OUTPATIENT
Start: 2023-04-12

## 2023-04-21 DIAGNOSIS — M13.0 POLYARTHROPATHY: ICD-10-CM

## 2023-04-24 RX ORDER — TRAMADOL HYDROCHLORIDE 50 MG/1
TABLET ORAL
Qty: 120 TABLET | Refills: 0 | Status: SHIPPED | OUTPATIENT
Start: 2023-04-24

## 2023-05-02 DIAGNOSIS — F41.9 ANXIETY: ICD-10-CM

## 2023-05-03 RX ORDER — ALPRAZOLAM 1 MG/1
TABLET ORAL
Qty: 90 TABLET | Refills: 0 | Status: SHIPPED | OUTPATIENT
Start: 2023-05-03

## 2023-05-12 DIAGNOSIS — G62.9 NEUROPATHY: ICD-10-CM

## 2023-05-12 RX ORDER — GABAPENTIN 300 MG/1
CAPSULE ORAL
Qty: 90 CAPSULE | Refills: 0 | Status: SHIPPED | OUTPATIENT
Start: 2023-05-12

## 2023-05-19 DIAGNOSIS — M13.0 POLYARTHROPATHY: ICD-10-CM

## 2023-05-19 RX ORDER — TRAMADOL HYDROCHLORIDE 50 MG/1
TABLET ORAL
Qty: 120 TABLET | Refills: 0 | Status: SHIPPED | OUTPATIENT
Start: 2023-05-19

## 2023-05-23 DIAGNOSIS — M13.0 POLYARTHROPATHY: ICD-10-CM

## 2023-05-23 NOTE — TELEPHONE ENCOUNTER
LOV 01/20/23  NOV 07/24/23     UDS and consent UTD       This medication failed when sent 4 days ago, can you resend?

## 2023-05-24 RX ORDER — TRAMADOL HYDROCHLORIDE 50 MG/1
TABLET ORAL
Qty: 120 TABLET | Refills: 0 | Status: SHIPPED | OUTPATIENT
Start: 2023-05-24

## 2023-05-30 DIAGNOSIS — F41.9 ANXIETY: ICD-10-CM

## 2023-05-31 RX ORDER — ALPRAZOLAM 1 MG/1
TABLET ORAL
Qty: 90 TABLET | Refills: 0 | Status: SHIPPED | OUTPATIENT
Start: 2023-05-31

## 2023-05-31 RX ORDER — CARVEDILOL 12.5 MG/1
TABLET ORAL
Qty: 60 TABLET | Refills: 3 | Status: SHIPPED | OUTPATIENT
Start: 2023-05-31

## 2023-06-07 DIAGNOSIS — G62.9 NEUROPATHY: ICD-10-CM

## 2023-06-07 RX ORDER — GABAPENTIN 300 MG/1
CAPSULE ORAL
Qty: 90 CAPSULE | Refills: 0 | Status: SHIPPED | OUTPATIENT
Start: 2023-06-07

## 2023-06-19 DIAGNOSIS — E53.8 VITAMIN B 12 DEFICIENCY: ICD-10-CM

## 2023-06-19 RX ORDER — FUROSEMIDE 20 MG/1
TABLET ORAL
Qty: 30 TABLET | Refills: 3 | Status: SHIPPED | OUTPATIENT
Start: 2023-06-19

## 2023-06-19 RX ORDER — CYANOCOBALAMIN 1000 UG/ML
INJECTION, SOLUTION INTRAMUSCULAR; SUBCUTANEOUS
Qty: 1 ML | Refills: 2 | Status: SHIPPED | OUTPATIENT
Start: 2023-06-19

## 2023-06-19 RX ORDER — HYDROCHLOROTHIAZIDE 12.5 MG/1
CAPSULE, GELATIN COATED ORAL
Qty: 60 CAPSULE | Refills: 3 | Status: SHIPPED | OUTPATIENT
Start: 2023-06-19

## 2023-07-27 ENCOUNTER — OFFICE VISIT (OUTPATIENT)
Dept: FAMILY MEDICINE CLINIC | Facility: CLINIC | Age: 73
End: 2023-07-27
Payer: MEDICARE

## 2023-07-27 VITALS
WEIGHT: 192.8 LBS | BODY MASS INDEX: 29.22 KG/M2 | HEIGHT: 68 IN | TEMPERATURE: 97.4 F | OXYGEN SATURATION: 98 % | SYSTOLIC BLOOD PRESSURE: 138 MMHG | HEART RATE: 64 BPM | DIASTOLIC BLOOD PRESSURE: 76 MMHG | RESPIRATION RATE: 16 BRPM

## 2023-07-27 DIAGNOSIS — R73.01 IMPAIRED FASTING GLUCOSE: ICD-10-CM

## 2023-07-27 DIAGNOSIS — Z12.5 SCREENING FOR PROSTATE CANCER: ICD-10-CM

## 2023-07-27 DIAGNOSIS — I10 BENIGN ESSENTIAL HTN: ICD-10-CM

## 2023-07-27 DIAGNOSIS — F41.9 ANXIETY: ICD-10-CM

## 2023-07-27 DIAGNOSIS — E53.8 VITAMIN B 12 DEFICIENCY: ICD-10-CM

## 2023-07-27 DIAGNOSIS — G62.9 NEUROPATHY: ICD-10-CM

## 2023-07-27 DIAGNOSIS — Z12.11 SCREEN FOR COLON CANCER: Primary | ICD-10-CM

## 2023-07-27 DIAGNOSIS — E78.2 MIXED HYPERLIPIDEMIA: ICD-10-CM

## 2023-07-27 PROCEDURE — 3075F SYST BP GE 130 - 139MM HG: CPT | Performed by: NURSE PRACTITIONER

## 2023-07-27 PROCEDURE — 1159F MED LIST DOCD IN RCRD: CPT | Performed by: NURSE PRACTITIONER

## 2023-07-27 PROCEDURE — 1170F FXNL STATUS ASSESSED: CPT | Performed by: NURSE PRACTITIONER

## 2023-07-27 PROCEDURE — 1160F RVW MEDS BY RX/DR IN RCRD: CPT | Performed by: NURSE PRACTITIONER

## 2023-07-27 PROCEDURE — 3078F DIAST BP <80 MM HG: CPT | Performed by: NURSE PRACTITIONER

## 2023-07-27 PROCEDURE — G0439 PPPS, SUBSEQ VISIT: HCPCS | Performed by: NURSE PRACTITIONER

## 2023-07-27 RX ORDER — ALPRAZOLAM 1 MG/1
1 TABLET ORAL 3 TIMES DAILY PRN
Qty: 90 TABLET | Refills: 0 | Status: CANCELLED | OUTPATIENT
Start: 2023-07-27

## 2023-07-27 RX ORDER — ATORVASTATIN CALCIUM 40 MG/1
40 TABLET, FILM COATED ORAL DAILY
Qty: 90 TABLET | Refills: 1 | Status: SHIPPED | OUTPATIENT
Start: 2023-07-27

## 2023-07-27 RX ORDER — GABAPENTIN 300 MG/1
300 CAPSULE ORAL 3 TIMES DAILY
Qty: 270 CAPSULE | Refills: 0 | Status: CANCELLED | OUTPATIENT
Start: 2023-07-27

## 2023-07-27 RX ORDER — ALPRAZOLAM 1 MG/1
1 TABLET ORAL 3 TIMES DAILY PRN
Qty: 90 TABLET | Refills: 0 | Status: SHIPPED | OUTPATIENT
Start: 2023-07-27

## 2023-07-27 NOTE — PROGRESS NOTES
The ABCs of the Annual Wellness Visit  Subsequent Medicare Wellness Visit    Subjective      Cristóbal Garcia is a 72 y.o. male who presents for a Subsequent Medicare Wellness Visit.    The following portions of the patient's history were reviewed and   updated as appropriate: allergies, current medications, past family history, past medical history, past social history, past surgical history, and problem list.    Compared to one year ago, the patient feels his physical   health is worse.    Compared to one year ago, the patient feels his mental   health is the same.    Recent Hospitalizations:  He was not admitted to the hospital during the last year.       Current Medical Providers:  Patient Care Team:  Tim Elaine APRN as PCP - General (Nurse Practitioner)    Outpatient Medications Prior to Visit   Medication Sig Dispense Refill    Aspirin Low Dose 81 MG EC tablet TAKE ONE TABLET BY MOUTH EVERY DAY 30 tablet 3    carvedilol (COREG) 12.5 MG tablet TAKE ONE TABLET BY MOUTH TWO TIMES A DAY WITH FOOD 60 tablet 3    celecoxib (CeleBREX) 200 MG capsule Take 1 capsule by mouth Daily. (Patient taking differently: Take 1 capsule by mouth Every Other Day.) 90 capsule 1    cyanocobalamin 1000 MCG/ML injection INJECT 1 ML INTO THE APPROPRIATE MUSCLE AS DIRECTED BY PRESCRIBER EVERY 30 DAYS. 1 mL 2    FeroSul 325 (65 Fe) MG tablet TAKE ONE TABLET BY MOUTH EVERY DAY 30 tablet 3    furosemide (LASIX) 20 MG tablet TAKE ONE TABLET BY MOUTH EVERY DAY 30 tablet 3    gabapentin (NEURONTIN) 300 MG capsule TAKE ONE CAPSULE THREE TIMES PER DAY 90 capsule 0    hydroCHLOROthiazide (MICROZIDE) 12.5 MG capsule TAKE ONE CAPSULE BY MOUTH TWO TIMES A DAY 60 capsule 3    loratadine (Claritin) 10 MG tablet Take 1 tablet by mouth Daily. (Patient taking differently: Take 1 tablet by mouth Daily As Needed.) 90 tablet 3    nitroglycerin (NITROSTAT) 0.4 MG SL tablet PLACE 1 TAB UNDER TONGUE FOR CHEST PAIN, MAY REPEAT EVERY 5 MINUTES UP TO 3 TABS THEN  CALL 911/GO E.R. 25 tablet 0    ondansetron (ZOFRAN) 4 MG tablet TAKE ONE TABLET BY MOUTH EVERY 8 HOURS 20 tablet 2    traMADol (ULTRAM) 50 MG tablet TAKE ONE TABLET BY MOUTH EVERY 6 HOURS AS NEEDED FOR MODERATE PAIN 120 tablet 0    ALPRAZolam (XANAX) 1 MG tablet TAKE ONE TABLET BY MOUTH THREE TIMES PER DAY AS NEEDED FOR ANXIETY. 90 tablet 0    atorvastatin (LIPITOR) 40 MG tablet Take 1 tablet by mouth Daily. 90 tablet 1    ofloxacin (OCUFLOX) 0.3 % ophthalmic solution PLACE 1 DROP INTO DIRECTED EYE FOUR TIMES PER DAY FOR 4 DAYS BEFORE AND 4 DAYS AFTER      amoxicillin-clavulanate (Augmentin) 875-125 MG per tablet Take 1 tablet by mouth 2 (Two) Times a Day. (Patient not taking: Reported on 7/27/2023) 20 tablet 0     No facility-administered medications prior to visit.       Opioid medication/s are on active medication list.  and I have evaluated his active treatment plan and pain score trends (see table).  Vitals:    07/27/23 1107   PainSc:   8   PainLoc: Leg     I have reviewed the chart for potential of high risk medication and harmful drug interactions in the elderly.          Aspirin is on active medication list. Aspirin use is indicated based on review of current medical condition/s. Pros and cons of this therapy have been discussed today. Benefits of this medication outweigh potential harm.  Patient has been encouraged to continue taking this medication.  .      Patient Active Problem List   Diagnosis    Alcoholism    Anxiety    Benign essential HTN    Cataracts, bilateral    Cervical spondylosis with myelopathy    Degeneration of lumbar intervertebral disc    Encounter for long-term (current) use of insulin    Forgetfulness    Gastroesophageal reflux disease    Hemorrhoids    Hyperlipidemia    Leg edema    Low back pain    Pain, generalized    Polyarthropathy    Shoulder pain    Vitamin B 12 deficiency    Iron deficiency anemia    Impaired fasting glucose     Advance Care Planning   Advance Care Planning    "  Advance Directive is not on file.  ACP discussion was held with the patient during this visit. Patient does not have an advance directive, information provided.     Objective    Vitals:    23 1107   BP: 138/76   BP Location: Right arm   Patient Position: Sitting   Cuff Size: Adult   Pulse: 64   Resp: 16   Temp: 97.4 °F (36.3 °C)   TempSrc: Temporal   SpO2: 98%   Weight: 87.5 kg (192 lb 12.8 oz)   Height: 172.7 cm (67.99\")   PainSc:   8   PainLoc: Leg     Estimated body mass index is 29.32 kg/m² as calculated from the following:    Height as of this encounter: 172.7 cm (67.99\").    Weight as of this encounter: 87.5 kg (192 lb 12.8 oz).    BMI is >= 25 and <30. (Overweight) The following options were offered after discussion;: weight loss educational material (shared in after visit summary)      Does the patient have evidence of cognitive impairment?   No            HEALTH RISK ASSESSMENT    Smoking Status:  Social History     Tobacco Use   Smoking Status Every Day    Packs/day: 2.00    Types: Cigarettes   Smokeless Tobacco Never     Alcohol Consumption:  Social History     Substance and Sexual Activity   Alcohol Use Not Currently    Comment: FORMER-QUIT      Fall Risk Screen:    AMELIEADI Fall Risk Assessment was completed, and patient is at MODERATE risk for falls. Assessment completed on:2023    Depression Screenin/27/2023    11:18 AM   PHQ-2/PHQ-9 Depression Screening   Little Interest or Pleasure in Doing Things 1-->several days   Feeling Down, Depressed or Hopeless 0-->not at all   PHQ-9: Brief Depression Severity Measure Score 1       Health Habits and Functional and Cognitive Screenin/27/2023    11:14 AM   Functional & Cognitive Status   Do you have difficulty preparing food and eating? No   Do you have difficulty bathing yourself, getting dressed or grooming yourself? No   Do you have difficulty using the toilet? No   Do you have difficulty moving around from place to place? No "   Do you have trouble with steps or getting out of a bed or a chair? No   Current Diet Unhealthy Diet   Dental Exam Not up to date   Eye Exam Up to date   Exercise (times per week) 0 times per week   Current Exercises Include No Regular Exercise   Do you need help using the phone?  No   Are you deaf or do you have serious difficulty hearing?  No   Do you need help to go to places out of walking distance? No   Do you need help shopping? No   Do you need help preparing meals?  No   Do you need help with housework?  No   Do you need help with laundry? No   Do you need help taking your medications? No   Do you need help managing money? No   Do you ever drive or ride in a car without wearing a seat belt? Yes   Have you felt unusual stress, anger or loneliness in the last month? Yes   Who do you live with? Other   If you need help, do you have trouble finding someone available to you? No   Have you been bothered in the last four weeks by sexual problems? No   Do you have difficulty concentrating, remembering or making decisions? No       Age-appropriate Screening Schedule:  Refer to the list below for future screening recommendations based on patient's age, sex and/or medical conditions. Orders for these recommended tests are listed in the plan section. The patient has been provided with a written plan.    Health Maintenance   Topic Date Due    HEPATITIS C SCREENING  Never done    COLORECTAL CANCER SCREENING  02/07/2023    COVID-19 Vaccine (6 - Moderna series) 09/05/2023 (Originally 6/9/2023)    INFLUENZA VACCINE  10/01/2023    LIPID PANEL  07/17/2024    ANNUAL WELLNESS VISIT  07/27/2024    TDAP/TD VACCINES (2 - Td or Tdap) 06/15/2026    Pneumococcal Vaccine 65+  Completed    AAA SCREEN (ONE-TIME)  Completed    ZOSTER VACCINE  Completed                  CMS Preventative Services Quick Reference  Risk Factors Identified During Encounter:    None Identified    The above risks/problems have been discussed with the  patient.  Pertinent information has been shared with the patient in the After Visit Summary.    Diagnoses and all orders for this visit:    1. Screen for colon cancer (Primary)  -     Cologuard - Stool, Per Rectum; Future    2. Neuropathy    3. Anxiety  -     ALPRAZolam (XANAX) 1 MG tablet; Take 1 tablet by mouth 3 (Three) Times a Day As Needed for Anxiety.  Dispense: 90 tablet; Refill: 0    4. Mixed hyperlipidemia  -     atorvastatin (LIPITOR) 40 MG tablet; Take 1 tablet by mouth Daily.  Dispense: 90 tablet; Refill: 1  -     Lipid Panel With / Chol / HDL Ratio; Future  -     Comprehensive Metabolic Panel; Future    5. Benign essential HTN  -     CBC (No Diff); Future  -     Urinalysis With Culture If Indicated -; Future    6. Vitamin B 12 deficiency  -     Vitamin B12; Future    7. Impaired fasting glucose  -     Hemoglobin A1c; Future    8. Screening for prostate cancer  -     PSA Screen; Future    Chief Complaint  Medicare Wellness-subsequent    Subjective        Cristóbal Garcia presents to [facility]  History of Present Illness  Anxiety:  Continues on medication  And still working well.    Hypertension:  Doing well on medication and well controlled.  138/76.    Impaired fasting glucose at home doing well. A1C is 5.4%    Hyperlipidemia:  Doing well on medication.    States afraid to sleep due o room mate being sick so only getting about 4 hours a night.    Anemia:  Doing well on medication.    Back pain :  Hurting a little since stopped pain medication.  All over arthralgia  Unsure if helped with tramadol.  Still having pain but doesn't want anything else and doesn't want to go to pain management.    Vitamin b deficiency.  Doing well on medication.    States 2 months ago was reaching into the car to get out battery and felt a pop and left bicep tendon thinks popped but doesn't want to fix.  Hypertension  Associated symptoms include anxiety. Pertinent negatives include no chest pain,  palpitations or shortness of breath.   Hyperlipidemia  Pertinent negatives include no chest pain or shortness of breath.   Anemia  There has been no fever or palpitations.   Allergies  Pertinent negatives include no chest pain, coughing, fever, numbness or weakness.   Anxiety  Patient reports no chest pain, dizziness, palpitations or shortness of breath.     His past medical history is significant for anemia.   Back Pain  Pertinent negatives include no chest pain, fever, numbness or weakness.     The following portions of the patient's history were personally reviewed and updated as appropriate: allergies, current medications, past medical history, past surgical history, past family history, and past social history.     Body mass index is 29.32 kg/m².    BMI is >= 25 and <30. (Overweight) The following options were offered after discussion;: weight loss educational material (shared in after visit summary)      Past History:    Medical History: has a past medical history of Acid reflux disease, Alcoholism, Anxiety, Arm pain, Arthritis, Cataracts, bilateral, Cervical spine pain, Gastric reflux, Generalized pain, HBP (high blood pressure), Hemorrhoids, High cholesterol, Joint pain, Leg pain, Low back pain (06/11/2012), Lumbar pain, Lumbar radiculitis (06/11/2012), Night sweats, Osteoarthritis (06/11/2012), Other chronic pain, and Shoulder pain.     Surgical History: has a past surgical history that includes Appendectomy; Cataract extraction; Cholecystectomy; and Shoulder surgery.     Family History: family history includes Cancer in his sister and other family members; Cancer (age of onset: 80) in his mother; Heart disease in his brother and another family member; Stroke in his father.     Social History: reports that he has been smoking cigarettes. He has been smoking an average of 2 packs per day. He has never used smokeless tobacco. He reports that he does not currently use alcohol. He reports that he does not use  drugs.    Allergies: Patient has no known allergies.          Current Outpatient Medications:     ALPRAZolam (XANAX) 1 MG tablet, Take 1 tablet by mouth 3 (Three) Times a Day As Needed for Anxiety., Disp: 90 tablet, Rfl: 0    Aspirin Low Dose 81 MG EC tablet, TAKE ONE TABLET BY MOUTH EVERY DAY, Disp: 30 tablet, Rfl: 3    atorvastatin (LIPITOR) 40 MG tablet, Take 1 tablet by mouth Daily., Disp: 90 tablet, Rfl: 1    carvedilol (COREG) 12.5 MG tablet, TAKE ONE TABLET BY MOUTH TWO TIMES A DAY WITH FOOD, Disp: 60 tablet, Rfl: 3    celecoxib (CeleBREX) 200 MG capsule, Take 1 capsule by mouth Daily. (Patient taking differently: Take 1 capsule by mouth Every Other Day.), Disp: 90 capsule, Rfl: 1    cyanocobalamin 1000 MCG/ML injection, INJECT 1 ML INTO THE APPROPRIATE MUSCLE AS DIRECTED BY PRESCRIBER EVERY 30 DAYS., Disp: 1 mL, Rfl: 2    FeroSul 325 (65 Fe) MG tablet, TAKE ONE TABLET BY MOUTH EVERY DAY, Disp: 30 tablet, Rfl: 3    furosemide (LASIX) 20 MG tablet, TAKE ONE TABLET BY MOUTH EVERY DAY, Disp: 30 tablet, Rfl: 3    gabapentin (NEURONTIN) 300 MG capsule, TAKE ONE CAPSULE THREE TIMES PER DAY, Disp: 90 capsule, Rfl: 0    hydroCHLOROthiazide (MICROZIDE) 12.5 MG capsule, TAKE ONE CAPSULE BY MOUTH TWO TIMES A DAY, Disp: 60 capsule, Rfl: 3    loratadine (Claritin) 10 MG tablet, Take 1 tablet by mouth Daily. (Patient taking differently: Take 1 tablet by mouth Daily As Needed.), Disp: 90 tablet, Rfl: 3    nitroglycerin (NITROSTAT) 0.4 MG SL tablet, PLACE 1 TAB UNDER TONGUE FOR CHEST PAIN, MAY REPEAT EVERY 5 MINUTES UP TO 3 TABS THEN CALL 911/GO E.R., Disp: 25 tablet, Rfl: 0    ondansetron (ZOFRAN) 4 MG tablet, TAKE ONE TABLET BY MOUTH EVERY 8 HOURS, Disp: 20 tablet, Rfl: 2    traMADol (ULTRAM) 50 MG tablet, TAKE ONE TABLET BY MOUTH EVERY 6 HOURS AS NEEDED FOR MODERATE PAIN, Disp: 120 tablet, Rfl: 0    Medications Discontinued During This Encounter   Medication Reason    ofloxacin (OCUFLOX) 0.3 % ophthalmic solution *Therapy  "completed    amoxicillin-clavulanate (Augmentin) 875-125 MG per tablet *Therapy completed    atorvastatin (LIPITOR) 40 MG tablet Reorder    ALPRAZolam (XANAX) 1 MG tablet Reorder         Review of Systems   Constitutional:  Negative for fever.   Respiratory:  Negative for cough and shortness of breath.    Cardiovascular:  Negative for chest pain, palpitations and leg swelling.   Musculoskeletal:  Positive for back pain.   Neurological:  Negative for dizziness, weakness, numbness and headache.      Objective         Vitals:    07/27/23 1107   BP: 138/76   BP Location: Right arm   Patient Position: Sitting   Cuff Size: Adult   Pulse: 64   Resp: 16   Temp: 97.4 °F (36.3 °C)   TempSrc: Temporal   SpO2: 98%   Weight: 87.5 kg (192 lb 12.8 oz)   Height: 172.7 cm (67.99\")     Body mass index is 29.32 kg/m².         Physical Exam  Vitals reviewed.   Constitutional:       Appearance: Normal appearance. He is well-developed.   HENT:      Head: Normocephalic and atraumatic.      Mouth/Throat:      Pharynx: No oropharyngeal exudate.   Eyes:      Conjunctiva/sclera: Conjunctivae normal.      Pupils: Pupils are equal, round, and reactive to light.   Cardiovascular:      Rate and Rhythm: Normal rate and regular rhythm.      Heart sounds: Normal heart sounds. No murmur heard.    No friction rub. No gallop.   Pulmonary:      Effort: Pulmonary effort is normal.      Breath sounds: Normal breath sounds. No wheezing or rhonchi.   Musculoskeletal:        Arms:       Comments: Muscle bunched on left bicep to elbow.  And already has one on left forearm   Skin:     General: Skin is warm and dry.   Neurological:      Mental Status: He is alert and oriented to person, place, and time.   Psychiatric:         Mood and Affect: Mood and affect normal.         Behavior: Behavior normal.         Thought Content: Thought content normal.         Judgment: Judgment normal.           Result Review :               Assessment and Plan     Diagnoses and " all orders for this visit:    1. Screen for colon cancer (Primary)  -     Cologuard - Stool, Per Rectum; Future    2. Neuropathy    3. Anxiety  -     ALPRAZolam (XANAX) 1 MG tablet; Take 1 tablet by mouth 3 (Three) Times a Day As Needed for Anxiety.  Dispense: 90 tablet; Refill: 0    4. Mixed hyperlipidemia  -     atorvastatin (LIPITOR) 40 MG tablet; Take 1 tablet by mouth Daily.  Dispense: 90 tablet; Refill: 1  -     Lipid Panel With / Chol / HDL Ratio; Future  -     Comprehensive Metabolic Panel; Future    5. Benign essential HTN  -     CBC (No Diff); Future  -     Urinalysis With Culture If Indicated -; Future    6. Vitamin B 12 deficiency  -     Vitamin B12; Future    7. Impaired fasting glucose  -     Hemoglobin A1c; Future    8. Screening for prostate cancer  -     PSA Screen; Future              Follow Up     Return in about 6 months (around 1/27/2024).    Patient was given instructions and counseling regarding his condition or for health maintenance advice. Please see specific information pulled into the AVS if appropriate.           Follow Up:   Next Medicare Wellness visit to be scheduled in 1 year.      An After Visit Summary and PPPS were made available to the patient.

## 2023-08-01 DIAGNOSIS — G62.9 NEUROPATHY: ICD-10-CM

## 2023-08-02 RX ORDER — GABAPENTIN 300 MG/1
CAPSULE ORAL
Qty: 90 CAPSULE | Refills: 2 | Status: SHIPPED | OUTPATIENT
Start: 2023-08-02

## 2023-08-11 DIAGNOSIS — M13.0 POLYARTHROPATHY: ICD-10-CM

## 2023-08-11 RX ORDER — TRAMADOL HYDROCHLORIDE 50 MG/1
TABLET ORAL
Qty: 120 TABLET | Refills: 0 | Status: SHIPPED | OUTPATIENT
Start: 2023-08-11

## 2023-08-21 DIAGNOSIS — F41.9 ANXIETY: ICD-10-CM

## 2023-08-21 RX ORDER — ALPRAZOLAM 1 MG/1
TABLET ORAL
Qty: 90 TABLET | Refills: 0 | Status: SHIPPED | OUTPATIENT
Start: 2023-08-21

## 2023-08-31 ENCOUNTER — TELEPHONE (OUTPATIENT)
Dept: FAMILY MEDICINE CLINIC | Facility: CLINIC | Age: 73
End: 2023-08-31
Payer: MEDICARE

## 2023-08-31 NOTE — TELEPHONE ENCOUNTER
Caller: CARINE MORFIN    Relationship: Brother/Sister    Best call back number: [phone]     Caller requesting test results: CARINE     What test was performed: COLOGUARD     When was the test performed: FIRST PART OF AUGUST NOT SURE DATE    Where was the test performed: HOME     Additional notes: PATIENT ALREADY COMPLETED A COLOGUARD IN AUGUST AND JUST RECEIVED A SECOND COLOGUARD TO COMPLETE AND WOULD LIKE A CALL BACK ON RESULTS FOR FIRST TEST AND DOES HE NEED TO COMPLETE SECOND COLOGUARD.    PLEASE ADVISE

## 2023-09-11 DIAGNOSIS — E53.8 VITAMIN B 12 DEFICIENCY: ICD-10-CM

## 2023-09-11 DIAGNOSIS — M13.0 POLYARTHROPATHY: ICD-10-CM

## 2023-09-11 RX ORDER — TRAMADOL HYDROCHLORIDE 50 MG/1
TABLET ORAL
Qty: 120 TABLET | Refills: 0 | Status: SHIPPED | OUTPATIENT
Start: 2023-09-11

## 2023-09-11 RX ORDER — CYANOCOBALAMIN 1000 UG/ML
INJECTION, SOLUTION INTRAMUSCULAR; SUBCUTANEOUS
Qty: 1 ML | Refills: 2 | Status: SHIPPED | OUTPATIENT
Start: 2023-09-11

## 2023-09-14 DIAGNOSIS — R19.5 POSITIVE COLORECTAL CANCER SCREENING USING COLOGUARD TEST: Primary | ICD-10-CM

## 2023-09-18 ENCOUNTER — TELEPHONE (OUTPATIENT)
Dept: FAMILY MEDICINE CLINIC | Facility: CLINIC | Age: 73
End: 2023-09-18
Payer: MEDICARE

## 2023-09-18 NOTE — TELEPHONE ENCOUNTER
Called Cristóbal, he contacted the office and they are looking for the referral in  the stack they had. Cristóbal was informed he would be contacted by tomorrow. I informed him that if he did not hear from them, to contact our office and we would reach out.

## 2023-09-20 DIAGNOSIS — F41.9 ANXIETY: ICD-10-CM

## 2023-09-20 RX ORDER — ALPRAZOLAM 1 MG/1
TABLET ORAL
Qty: 90 TABLET | Refills: 0 | Status: SHIPPED | OUTPATIENT
Start: 2023-09-20

## 2023-09-25 RX ORDER — CARVEDILOL 12.5 MG/1
TABLET ORAL
Qty: 60 TABLET | Refills: 3 | Status: SHIPPED | OUTPATIENT
Start: 2023-09-25

## 2023-10-11 DIAGNOSIS — M13.0 POLYARTHROPATHY: ICD-10-CM

## 2023-10-11 RX ORDER — TRAMADOL HYDROCHLORIDE 50 MG/1
50 TABLET ORAL EVERY 6 HOURS PRN
Qty: 120 TABLET | Refills: 0 | Status: SHIPPED | OUTPATIENT
Start: 2023-10-11

## 2023-10-16 DIAGNOSIS — F41.9 ANXIETY: ICD-10-CM

## 2023-10-17 RX ORDER — ALPRAZOLAM 1 MG/1
TABLET ORAL
Qty: 90 TABLET | Refills: 0 | Status: SHIPPED | OUTPATIENT
Start: 2023-10-17

## 2023-10-24 DIAGNOSIS — G62.9 NEUROPATHY: ICD-10-CM

## 2023-10-24 RX ORDER — GABAPENTIN 300 MG/1
CAPSULE ORAL
Qty: 90 CAPSULE | Refills: 2 | Status: SHIPPED | OUTPATIENT
Start: 2023-10-24

## 2023-11-06 DIAGNOSIS — M13.0 POLYARTHROPATHY: ICD-10-CM

## 2023-11-06 RX ORDER — TRAMADOL HYDROCHLORIDE 50 MG/1
50 TABLET ORAL EVERY 6 HOURS PRN
Qty: 120 TABLET | Refills: 0 | Status: SHIPPED | OUTPATIENT
Start: 2023-11-06

## 2023-11-13 DIAGNOSIS — F41.9 ANXIETY: ICD-10-CM

## 2023-11-13 RX ORDER — ALPRAZOLAM 1 MG/1
TABLET ORAL
Qty: 90 TABLET | Refills: 0 | Status: SHIPPED | OUTPATIENT
Start: 2023-11-13

## 2023-12-05 DIAGNOSIS — M13.0 POLYARTHROPATHY: ICD-10-CM

## 2023-12-06 DIAGNOSIS — E53.8 VITAMIN B 12 DEFICIENCY: ICD-10-CM

## 2023-12-06 RX ORDER — TRAMADOL HYDROCHLORIDE 50 MG/1
50 TABLET ORAL EVERY 6 HOURS PRN
Qty: 120 TABLET | Refills: 0 | Status: SHIPPED | OUTPATIENT
Start: 2023-12-06

## 2023-12-06 RX ORDER — FUROSEMIDE 20 MG/1
TABLET ORAL
Qty: 30 TABLET | Refills: 3 | Status: SHIPPED | OUTPATIENT
Start: 2023-12-06

## 2023-12-06 RX ORDER — CYANOCOBALAMIN 1000 UG/ML
INJECTION, SOLUTION INTRAMUSCULAR; SUBCUTANEOUS
Qty: 1 ML | Refills: 2 | Status: SHIPPED | OUTPATIENT
Start: 2023-12-06

## 2023-12-11 DIAGNOSIS — F41.9 ANXIETY: ICD-10-CM

## 2023-12-11 RX ORDER — ALPRAZOLAM 1 MG/1
TABLET ORAL
Qty: 90 TABLET | Refills: 0 | Status: SHIPPED | OUTPATIENT
Start: 2023-12-11

## 2024-01-01 ENCOUNTER — TELEPHONE (OUTPATIENT)
Dept: FAMILY MEDICINE CLINIC | Facility: CLINIC | Age: 74
End: 2024-01-01
Payer: MEDICARE

## 2024-01-01 DIAGNOSIS — R11.0 NAUSEA: Primary | ICD-10-CM

## 2024-01-01 RX ORDER — ONDANSETRON 4 MG/1
4 TABLET, FILM COATED ORAL EVERY 8 HOURS PRN
Qty: 20 TABLET | Refills: 1 | Status: SHIPPED | OUTPATIENT
Start: 2024-01-01

## 2024-01-02 DIAGNOSIS — M13.0 POLYARTHROPATHY: ICD-10-CM

## 2024-01-03 RX ORDER — TRAMADOL HYDROCHLORIDE 50 MG/1
50 TABLET ORAL EVERY 6 HOURS PRN
Qty: 120 TABLET | Refills: 0 | Status: SHIPPED | OUTPATIENT
Start: 2024-01-03

## 2024-01-09 DIAGNOSIS — F41.9 ANXIETY: ICD-10-CM

## 2024-01-10 RX ORDER — CARVEDILOL 12.5 MG/1
TABLET ORAL
Qty: 60 TABLET | Refills: 3 | Status: SHIPPED | OUTPATIENT
Start: 2024-01-10

## 2024-01-10 RX ORDER — ALPRAZOLAM 1 MG/1
TABLET ORAL
Qty: 90 TABLET | Refills: 0 | Status: SHIPPED | OUTPATIENT
Start: 2024-01-10

## 2024-01-10 RX ORDER — HYDROCHLOROTHIAZIDE 12.5 MG/1
CAPSULE, GELATIN COATED ORAL
Qty: 60 CAPSULE | Refills: 3 | Status: SHIPPED | OUTPATIENT
Start: 2024-01-10

## 2024-01-17 DIAGNOSIS — G62.9 NEUROPATHY: ICD-10-CM

## 2024-01-18 RX ORDER — GABAPENTIN 300 MG/1
CAPSULE ORAL
Qty: 90 CAPSULE | Refills: 2 | Status: SHIPPED | OUTPATIENT
Start: 2024-01-18

## 2024-01-25 ENCOUNTER — APPOINTMENT (OUTPATIENT)
Dept: GENERAL RADIOLOGY | Facility: HOSPITAL | Age: 74
End: 2024-01-25
Payer: MEDICARE

## 2024-01-25 ENCOUNTER — HOSPITAL ENCOUNTER (EMERGENCY)
Facility: HOSPITAL | Age: 74
Discharge: HOME | End: 2024-01-25
Attending: EMERGENCY MEDICINE | Admitting: STUDENT IN AN ORGANIZED HEALTH CARE EDUCATION/TRAINING PROGRAM
Payer: MEDICARE

## 2024-01-25 ENCOUNTER — OFFICE VISIT (OUTPATIENT)
Dept: FAMILY MEDICINE CLINIC | Facility: CLINIC | Age: 74
End: 2024-01-25
Payer: MEDICARE

## 2024-01-25 VITALS
TEMPERATURE: 101 F | HEART RATE: 86 BPM | RESPIRATION RATE: 20 BRPM | BODY MASS INDEX: 29.24 KG/M2 | SYSTOLIC BLOOD PRESSURE: 144 MMHG | DIASTOLIC BLOOD PRESSURE: 61 MMHG | HEIGHT: 68 IN | WEIGHT: 192.9 LBS | OXYGEN SATURATION: 94 %

## 2024-01-25 VITALS
HEART RATE: 116 BPM | TEMPERATURE: 98.8 F | OXYGEN SATURATION: 93 % | SYSTOLIC BLOOD PRESSURE: 130 MMHG | RESPIRATION RATE: 22 BRPM | DIASTOLIC BLOOD PRESSURE: 64 MMHG

## 2024-01-25 DIAGNOSIS — E53.8 VITAMIN B 12 DEFICIENCY: ICD-10-CM

## 2024-01-25 DIAGNOSIS — U07.1 COVID: Primary | ICD-10-CM

## 2024-01-25 DIAGNOSIS — J44.1 COPD EXACERBATION: ICD-10-CM

## 2024-01-25 DIAGNOSIS — U07.1 COVID: ICD-10-CM

## 2024-01-25 DIAGNOSIS — R73.01 IMPAIRED FASTING GLUCOSE: ICD-10-CM

## 2024-01-25 DIAGNOSIS — I10 BENIGN ESSENTIAL HTN: ICD-10-CM

## 2024-01-25 DIAGNOSIS — R09.02 HYPOXIA: Primary | ICD-10-CM

## 2024-01-25 DIAGNOSIS — E78.2 MIXED HYPERLIPIDEMIA: ICD-10-CM

## 2024-01-25 LAB
ALBUMIN SERPL-MCNC: 3.5 G/DL (ref 3.5–5.2)
ALBUMIN/GLOB SERPL: 1 G/DL
ALP SERPL-CCNC: 55 U/L (ref 39–117)
ALT SERPL W P-5'-P-CCNC: 13 U/L (ref 1–41)
ANION GAP SERPL CALCULATED.3IONS-SCNC: 12.6 MMOL/L (ref 5–15)
AST SERPL-CCNC: 23 U/L (ref 1–40)
BASOPHILS # BLD AUTO: 0.04 10*3/MM3 (ref 0–0.2)
BASOPHILS NFR BLD AUTO: 0.4 % (ref 0–1.5)
BILIRUB SERPL-MCNC: 0.2 MG/DL (ref 0–1.2)
BUN SERPL-MCNC: 26 MG/DL (ref 8–23)
BUN/CREAT SERPL: 21.1 (ref 7–25)
CALCIUM SPEC-SCNC: 8.8 MG/DL (ref 8.6–10.5)
CHLORIDE SERPL-SCNC: 93 MMOL/L (ref 98–107)
CO2 SERPL-SCNC: 31.4 MMOL/L (ref 22–29)
CREAT SERPL-MCNC: 1.23 MG/DL (ref 0.76–1.27)
D-LACTATE SERPL-SCNC: 1.1 MMOL/L (ref 0.5–2)
DEPRECATED RDW RBC AUTO: 42.9 FL (ref 37–54)
EGFRCR SERPLBLD CKD-EPI 2021: 62.4 ML/MIN/1.73
EOSINOPHIL # BLD AUTO: 0.01 10*3/MM3 (ref 0–0.4)
EOSINOPHIL NFR BLD AUTO: 0.1 % (ref 0.3–6.2)
ERYTHROCYTE [DISTWIDTH] IN BLOOD BY AUTOMATED COUNT: 12.5 % (ref 12.3–15.4)
EXPIRATION DATE: ABNORMAL
FLUAV AG UPPER RESP QL IA.RAPID: NOT DETECTED
FLUAV SUBTYP SPEC NAA+PROBE: NOT DETECTED
FLUBV AG UPPER RESP QL IA.RAPID: NOT DETECTED
FLUBV RNA ISLT QL NAA+PROBE: NOT DETECTED
GLOBULIN UR ELPH-MCNC: 3.4 GM/DL
GLUCOSE SERPL-MCNC: 102 MG/DL (ref 65–99)
HCT VFR BLD AUTO: 37.6 % (ref 37.5–51)
HGB BLD-MCNC: 11.9 G/DL (ref 13–17.7)
HOLD SPECIMEN: NORMAL
HOLD SPECIMEN: NORMAL
IMM GRANULOCYTES # BLD AUTO: 0.05 10*3/MM3 (ref 0–0.05)
IMM GRANULOCYTES NFR BLD AUTO: 0.5 % (ref 0–0.5)
INTERNAL CONTROL: ABNORMAL
LYMPHOCYTES # BLD AUTO: 0.87 10*3/MM3 (ref 0.7–3.1)
LYMPHOCYTES NFR BLD AUTO: 8.5 % (ref 19.6–45.3)
Lab: ABNORMAL
MCH RBC QN AUTO: 29.5 PG (ref 26.6–33)
MCHC RBC AUTO-ENTMCNC: 31.6 G/DL (ref 31.5–35.7)
MCV RBC AUTO: 93.3 FL (ref 79–97)
MONOCYTES # BLD AUTO: 1.33 10*3/MM3 (ref 0.1–0.9)
MONOCYTES NFR BLD AUTO: 13 % (ref 5–12)
NEUTROPHILS NFR BLD AUTO: 7.91 10*3/MM3 (ref 1.7–7)
NEUTROPHILS NFR BLD AUTO: 77.5 % (ref 42.7–76)
NRBC BLD AUTO-RTO: 0 /100 WBC (ref 0–0.2)
NT-PROBNP SERPL-MCNC: 9022 PG/ML (ref 0–900)
PLATELET # BLD AUTO: 287 10*3/MM3 (ref 140–450)
PMV BLD AUTO: 9.7 FL (ref 6–12)
POTASSIUM SERPL-SCNC: 3.8 MMOL/L (ref 3.5–5.2)
PROT SERPL-MCNC: 6.9 G/DL (ref 6–8.5)
QT INTERVAL: 390 MS
QTC INTERVAL: 476 MS
RBC # BLD AUTO: 4.03 10*6/MM3 (ref 4.14–5.8)
RSV RNA NPH QL NAA+NON-PROBE: NOT DETECTED
SARS-COV-2 AG UPPER RESP QL IA.RAPID: DETECTED
SARS-COV-2 RNA RESP QL NAA+PROBE: DETECTED
SODIUM SERPL-SCNC: 137 MMOL/L (ref 136–145)
TROPONIN T SERPL HS-MCNC: 92 NG/L
WBC NRBC COR # BLD AUTO: 10.21 10*3/MM3 (ref 3.4–10.8)
WHOLE BLOOD HOLD COAG: NORMAL
WHOLE BLOOD HOLD SPECIMEN: NORMAL

## 2024-01-25 PROCEDURE — 93005 ELECTROCARDIOGRAM TRACING: CPT | Performed by: EMERGENCY MEDICINE

## 2024-01-25 PROCEDURE — 25010000002 METHYLPREDNISOLONE PER 125 MG: Performed by: EMERGENCY MEDICINE

## 2024-01-25 PROCEDURE — 94799 UNLISTED PULMONARY SVC/PX: CPT

## 2024-01-25 PROCEDURE — 84484 ASSAY OF TROPONIN QUANT: CPT | Performed by: EMERGENCY MEDICINE

## 2024-01-25 PROCEDURE — 99284 EMERGENCY DEPT VISIT MOD MDM: CPT

## 2024-01-25 PROCEDURE — 36415 COLL VENOUS BLD VENIPUNCTURE: CPT

## 2024-01-25 PROCEDURE — 83605 ASSAY OF LACTIC ACID: CPT

## 2024-01-25 PROCEDURE — 3078F DIAST BP <80 MM HG: CPT | Performed by: NURSE PRACTITIONER

## 2024-01-25 PROCEDURE — 87040 BLOOD CULTURE FOR BACTERIA: CPT

## 2024-01-25 PROCEDURE — 96374 THER/PROPH/DIAG INJ IV PUSH: CPT

## 2024-01-25 PROCEDURE — 1159F MED LIST DOCD IN RCRD: CPT | Performed by: NURSE PRACTITIONER

## 2024-01-25 PROCEDURE — 94618 PULMONARY STRESS TESTING: CPT

## 2024-01-25 PROCEDURE — 83880 ASSAY OF NATRIURETIC PEPTIDE: CPT | Performed by: EMERGENCY MEDICINE

## 2024-01-25 PROCEDURE — 94761 N-INVAS EAR/PLS OXIMETRY MLT: CPT

## 2024-01-25 PROCEDURE — 3075F SYST BP GE 130 - 139MM HG: CPT | Performed by: NURSE PRACTITIONER

## 2024-01-25 PROCEDURE — 85025 COMPLETE CBC W/AUTO DIFF WBC: CPT

## 2024-01-25 PROCEDURE — 94640 AIRWAY INHALATION TREATMENT: CPT

## 2024-01-25 PROCEDURE — 1160F RVW MEDS BY RX/DR IN RCRD: CPT | Performed by: NURSE PRACTITIONER

## 2024-01-25 PROCEDURE — 93005 ELECTROCARDIOGRAM TRACING: CPT

## 2024-01-25 PROCEDURE — 87428 SARSCOV & INF VIR A&B AG IA: CPT | Performed by: NURSE PRACTITIONER

## 2024-01-25 PROCEDURE — 87637 SARSCOV2&INF A&B&RSV AMP PRB: CPT | Performed by: EMERGENCY MEDICINE

## 2024-01-25 PROCEDURE — 80053 COMPREHEN METABOLIC PANEL: CPT | Performed by: EMERGENCY MEDICINE

## 2024-01-25 PROCEDURE — 71045 X-RAY EXAM CHEST 1 VIEW: CPT

## 2024-01-25 PROCEDURE — 99214 OFFICE O/P EST MOD 30 MIN: CPT | Performed by: NURSE PRACTITIONER

## 2024-01-25 RX ORDER — DEXAMETHASONE 4 MG/1
6 TABLET ORAL 3 TIMES DAILY
Qty: 7 TABLET | Refills: 0 | Status: SHIPPED | OUTPATIENT
Start: 2024-01-25

## 2024-01-25 RX ORDER — GENTAMICIN SULFATE 3 MG/ML
1 SOLUTION/ DROPS OPHTHALMIC 4 TIMES DAILY
COMMUNITY

## 2024-01-25 RX ORDER — SODIUM CHLORIDE 0.9 % (FLUSH) 0.9 %
10 SYRINGE (ML) INJECTION AS NEEDED
Status: DISCONTINUED | OUTPATIENT
Start: 2024-01-25 | End: 2024-01-25 | Stop reason: HOSPADM

## 2024-01-25 RX ORDER — IPRATROPIUM BROMIDE AND ALBUTEROL SULFATE 2.5; .5 MG/3ML; MG/3ML
3 SOLUTION RESPIRATORY (INHALATION) ONCE
Status: COMPLETED | OUTPATIENT
Start: 2024-01-25 | End: 2024-01-25

## 2024-01-25 RX ORDER — METHYLPREDNISOLONE SODIUM SUCCINATE 125 MG/2ML
125 INJECTION, POWDER, LYOPHILIZED, FOR SOLUTION INTRAMUSCULAR; INTRAVENOUS ONCE
Status: COMPLETED | OUTPATIENT
Start: 2024-01-25 | End: 2024-01-25

## 2024-01-25 RX ADMIN — METHYLPREDNISOLONE SODIUM SUCCINATE 125 MG: 125 INJECTION INTRAMUSCULAR; INTRAVENOUS at 15:37

## 2024-01-25 RX ADMIN — IPRATROPIUM BROMIDE AND ALBUTEROL SULFATE 3 ML: .5; 3 SOLUTION RESPIRATORY (INHALATION) at 14:41

## 2024-01-25 NOTE — PROGRESS NOTES
Chief Complaint  covid    Subjective        Cristóbal Garcia presents to [facility]  History of Present Illness  Covid symptoms today.  He started with symptoms  of congestion and cough a week ago and took a test  earlier in the week and was covid negative then 2 days ago and was positive for Covid.  Has oxygen at home he states sara is his wifes.  Sats 61% on room air walking into office today.  93% on 4 liters at rest that then dropped to 88% at rest without speaking and had to increase to 4 L.    Has had bivalent covid booster last year and 4 other covid immunizations.  His o2 is currenly at 90-93%.  He can speak but is in short sentences.  States he feels like his ribs are cracking open when he takes a deep breath.  Has had nausea the last week since has been sick so hasn't been taking his medication because hasn't had an appetite.        Hypertension:  Doing well on medication and well controlled.  130/64..  heart rate is 91 here     Impaired fasting glucose at home doing well. A1C is 5.4%    Hyperlipidemia:  Doing well on medication.    Anemia:  Doing well on medication.    Back pain :  Hurting a little since stopped pain medication.  All over arthralgia  Unsure if helped with tramadol.  Still having pain but doesn't want anything else and doesn't want to go to pain management.  States that hasn't had much trouble with back.      Hypertension  Associated symptoms include anxiety. Pertinent negatives include no chest pain, palpitations or shortness of breath.   Hyperlipidemia  Pertinent negatives include no chest pain or shortness of breath.   Anemia  There has been no fever or palpitations.   Allergies  Pertinent negatives include no chest pain, coughing, fever, numbness or weakness.   Anxiety  Patient reports no chest pain, dizziness, palpitations or shortness of breath.     His past medical history is significant for anemia.   Back Pain  Pertinent negatives include no chest pain,  fever, numbness or weakness.       The following portions of the patient's history were personally reviewed and updated as appropriate: allergies, current medications, past medical history, past surgical history, past family history, and past social history.     There is no height or weight on file to calculate BMI.           Past History:    Medical History: has a past medical history of Acid reflux disease, Alcoholism, Anxiety, Arm pain, Arthritis, Cataracts, bilateral, Cervical spine pain, Gastric reflux, Generalized pain, HBP (high blood pressure), Hemorrhoids, High cholesterol, Joint pain, Leg pain, Low back pain (06/11/2012), Lumbar pain, Lumbar radiculitis (06/11/2012), Night sweats, Osteoarthritis (06/11/2012), Other chronic pain, and Shoulder pain.     Surgical History: has a past surgical history that includes Appendectomy; Cataract extraction; Cholecystectomy; and Shoulder surgery.     Family History: family history includes Cancer in his sister and other family members; Cancer (age of onset: 80) in his mother; Heart disease in his brother and another family member; Stroke in his father.     Social History: reports that he has been smoking cigarettes. He has been smoking an average of 2 packs per day. He has never used smokeless tobacco. He reports that he does not currently use alcohol. He reports that he does not use drugs.    Allergies: Patient has no known allergies.          Current Outpatient Medications:     ALPRAZolam (XANAX) 1 MG tablet, TAKE ONE TABLET BY MOUTH THREE TIMES PER DAY AS NEEDED FOR ANXIETY, Disp: 90 tablet, Rfl: 0    Aspirin Low Dose 81 MG EC tablet, TAKE ONE TABLET BY MOUTH EVERY DAY, Disp: 30 tablet, Rfl: 3    atorvastatin (LIPITOR) 40 MG tablet, Take 1 tablet by mouth Daily., Disp: 90 tablet, Rfl: 1    carvedilol (COREG) 12.5 MG tablet, TAKE ONE TABLET BY MOUTH TWO TIMES PER DAY, Disp: 60 tablet, Rfl: 3    celecoxib (CeleBREX) 200 MG capsule, Take 1 capsule by mouth Daily.  (Patient taking differently: Take 1 capsule by mouth Every Other Day.), Disp: 90 capsule, Rfl: 1    cyanocobalamin 1000 MCG/ML injection, INJECT 1 ML INTO THE APPROPRIATE MUSCLE AS DIRECTED BY PRESCRIBER EVERY 30 DAYS., Disp: 1 mL, Rfl: 2    FeroSul 325 (65 Fe) MG tablet, TAKE ONE TABLET BY MOUTH EVERY DAY, Disp: 30 tablet, Rfl: 3    furosemide (LASIX) 20 MG tablet, TAKE ONE TABLET BY MOUTH EVERY DAY, Disp: 30 tablet, Rfl: 3    gabapentin (NEURONTIN) 300 MG capsule, TAKE ONE CAPSULE BY MOUTH THREE TIMES PER DAY, Disp: 90 capsule, Rfl: 2    gentamicin (GARAMYCIN) 0.3 % ophthalmic solution, PLACE 1 DROP IN RIGHT EYE FOUR TIMES PER DAY FOR 4 DAYS BEFORE INJECTIONS AND 4 DAYS AFTER INJECTIONS, Disp: , Rfl:     hydroCHLOROthiazide (MICROZIDE) 12.5 MG capsule, TAKE ONE CAPSULE BY MOUTH TWO TIMES A DAY, Disp: 60 capsule, Rfl: 3    loratadine (Claritin) 10 MG tablet, Take 1 tablet by mouth Daily. (Patient taking differently: Take 1 tablet by mouth Daily As Needed.), Disp: 90 tablet, Rfl: 3    nitroglycerin (NITROSTAT) 0.4 MG SL tablet, PLACE 1 TAB UNDER TONGUE FOR CHEST PAIN, MAY REPEAT EVERY 5 MINUTES UP TO 3 TABS THEN CALL 911/GO E.R., Disp: 25 tablet, Rfl: 0    ondansetron (ZOFRAN) 4 MG tablet, TAKE ONE TABLET BY MOUTH EVERY 8 HOURS, Disp: 20 tablet, Rfl: 2    traMADol (ULTRAM) 50 MG tablet, TAKE ONE TABLET BY MOUTH EVERY 6 HOURS AS NEEDED FOR MODERATE PAIN, Disp: 120 tablet, Rfl: 0    There are no discontinued medications.      Review of Systems   Constitutional:  Negative for fever.   Respiratory:  Negative for cough and shortness of breath.    Cardiovascular:  Negative for chest pain and palpitations.   Musculoskeletal:  Positive for back pain.   Neurological:  Negative for dizziness, weakness and numbness.        Objective         Vitals:    01/25/24 1032 01/25/24 1033   BP: 130/64    BP Location: Right arm    Patient Position: Sitting    Cuff Size: Large Adult    Pulse: 116    Resp: 22    Temp: 98.8 °F (37.1 °C)     TempSrc: Temporal    SpO2: (!) 61% 93%     There is no height or weight on file to calculate BMI.         Physical Exam  Vitals reviewed.   Constitutional:       Appearance: Normal appearance. He is well-developed.   HENT:      Head: Normocephalic and atraumatic.      Mouth/Throat:      Pharynx: No oropharyngeal exudate.   Eyes:      Conjunctiva/sclera: Conjunctivae normal.      Pupils: Pupils are equal, round, and reactive to light.   Cardiovascular:      Rate and Rhythm: Normal rate and regular rhythm.      Heart sounds: Normal heart sounds. No murmur heard.     No friction rub. No gallop.   Pulmonary:      Effort: Pulmonary effort is normal.      Breath sounds: Rhonchi present. No wheezing.   Skin:     General: Skin is warm and dry.      Coloration: Skin is pale.   Neurological:      Mental Status: He is alert and oriented to person, place, and time.   Psychiatric:         Mood and Affect: Mood and affect normal.         Behavior: Behavior normal.         Thought Content: Thought content normal.         Judgment: Judgment normal.             Result Review :  positive for covid here as well but not influenza.               Assessment and Plan     Diagnoses and all orders for this visit:    1. Hypoxia (Primary)  -     POCT SARS-CoV-2 + Flu Antigen RACHEL    2. COVID  -     POCT SARS-CoV-2 + Flu Antigen RACHEL    3. Benign essential HTN  -     Lipid Panel With / Chol / HDL Ratio; Future  -     Comprehensive Metabolic Panel; Future  -     CBC (No Diff); Future  -     Urinalysis With Culture If Indicated -; Future    4. Mixed hyperlipidemia    5. Impaired fasting glucose  -     Hemoglobin A1c; Future    6. Vitamin B 12 deficiency  -     Vitamin B12; Future    FluA and B are negative.    Report called to Dr. Souza.  Calling for ambulance to ED.  Due to hypoxia     Follow Up     Return in about 6 months (around 7/25/2024) for Next scheduled follow up.    Patient was given instructions and counseling regarding his  condition or for health maintenance advice. Please see specific information pulled into the AVS if appropriate.

## 2024-01-25 NOTE — PROGRESS NOTES
Walking Oximetry Progress Note      Patient Name:  Cristóbal Garcia  YOB: 1950  Date of Procedure: 01/25/24              ROOM AIR BASELINE   SpO2% 89   Heart Rate 89     EXERCISE ON ROOM AIR SpO2% EXERCISE ON O2 LPM SpO2%   1 MINUTE 81 1 MINUTE 1 83   2 MINUTES . 2 MINUTES 2 92   3 MINUTES . 3 MINUTES 2 94   4 MINUTES . 4 MINUTES 2 95   5 MINUTES . 5 MINUTES 2 94   6 MINUTES . 6 MINUTES 2 94              Time to Recovery  < 1 min   SpO2% Post Exercise  94 on 2 L nasal cannula.    HR Post Exercise  100     Comments: RN requested RT to perform the walking test. PATIENT IS COVID POSITIVE, productive congested cough present. This may be affecting oxygen needs. Patient displayed no increased work of breathing for duration of test, but moved at a extremely slow pace.            Electronically signed by Marlena Wilcox RRT, 01/25/24, 4:05 PM EST.

## 2024-01-25 NOTE — ED PROVIDER NOTES
Time: 12:42 PM EST  Date of encounter:  1/25/2024  Independent Historian/Clinical History and Information was obtained by:   {Blank multiple:69801}    History is limited by: {Limited History:33783}    Chief Complaint: ***      History of Present Illness:  Patient is a 73 y.o. year old male who presents to the emergency department for evaluation of ***    HPI    Patient Care Team  Primary Care Provider: Tim Elaine APRN    Past Medical History:     No Known Allergies  Past Medical History:   Diagnosis Date   • Acid reflux disease    • Alcoholism    • Anxiety    • Arm pain    • Arthritis    • Cataracts, bilateral    • Cervical spine pain    • Gastric reflux    • Generalized pain    • HBP (high blood pressure)    • Hemorrhoids    • High cholesterol    • Joint pain    • Leg pain    • Low back pain 06/11/2012   • Lumbar pain    • Lumbar radiculitis 06/11/2012   • Night sweats    • Osteoarthritis 06/11/2012   • Other chronic pain    • Shoulder pain      Past Surgical History:   Procedure Laterality Date   • APPENDECTOMY     • CATARACT EXTRACTION      WITH LENS IMPLANT   • CHOLECYSTECTOMY     • SHOULDER SURGERY       Family History   Problem Relation Age of Onset   • Cancer Mother 80   • Stroke Father    • Cancer Sister    • Heart disease Brother    • Heart disease Other    • Cancer Other    • Cancer Other        Home Medications:  Prior to Admission medications    Medication Sig Start Date End Date Taking? Authorizing Provider   ALPRAZolam (XANAX) 1 MG tablet TAKE ONE TABLET BY MOUTH THREE TIMES PER DAY AS NEEDED FOR ANXIETY 1/10/24   Tim Elaine APRN   Aspirin Low Dose 81 MG EC tablet TAKE ONE TABLET BY MOUTH EVERY DAY 7/17/23   Tim Elaine APRN   atorvastatin (LIPITOR) 40 MG tablet Take 1 tablet by mouth Daily. 7/27/23   Tim Elaine APRN   carvedilol (COREG) 12.5 MG tablet TAKE ONE TABLET BY MOUTH TWO TIMES PER DAY 1/10/24   Tim Elaine APRN   celecoxib (CeleBREX) 200 MG capsule Take 1 capsule by  mouth Daily.  Patient taking differently: Take 1 capsule by mouth Every Other Day. 8/3/22   Tim Elaine APRN   cyanocobalamin 1000 MCG/ML injection INJECT 1 ML INTO THE APPROPRIATE MUSCLE AS DIRECTED BY PRESCRIBER EVERY 30 DAYS. 12/6/23   Tim Elaine APRN   FeroSul 325 (65 Fe) MG tablet TAKE ONE TABLET BY MOUTH EVERY DAY 7/17/23   Tim Elaine APRN   furosemide (LASIX) 20 MG tablet TAKE ONE TABLET BY MOUTH EVERY DAY 12/6/23   Tim Elaine APRN   gabapentin (NEURONTIN) 300 MG capsule TAKE ONE CAPSULE BY MOUTH THREE TIMES PER DAY 1/18/24   Tim Elaine APRN   gentamicin (GARAMYCIN) 0.3 % ophthalmic solution PLACE 1 DROP IN RIGHT EYE FOUR TIMES PER DAY FOR 4 DAYS BEFORE INJECTIONS AND 4 DAYS AFTER INJECTIONS    Provider, MD Quinn   hydroCHLOROthiazide (MICROZIDE) 12.5 MG capsule TAKE ONE CAPSULE BY MOUTH TWO TIMES A DAY 1/10/24   Tim Elaine APRN   loratadine (Claritin) 10 MG tablet Take 1 tablet by mouth Daily.  Patient taking differently: Take 1 tablet by mouth Daily As Needed. 1/20/23   Tim Elaine APRN   nitroglycerin (NITROSTAT) 0.4 MG SL tablet PLACE 1 TAB UNDER TONGUE FOR CHEST PAIN, MAY REPEAT EVERY 5 MINUTES UP TO 3 TABS THEN CALL 911/GO E.R. 12/12/22   Tim Elanie APRN   ondansetron (ZOFRAN) 4 MG tablet TAKE ONE TABLET BY MOUTH EVERY 8 HOURS 11/2/21   Tim Elaine APRN   traMADol (ULTRAM) 50 MG tablet TAKE ONE TABLET BY MOUTH EVERY 6 HOURS AS NEEDED FOR MODERATE PAIN 1/3/24   Tim Elaine APRN        Social History:   Social History     Tobacco Use   • Smoking status: Every Day     Packs/day: 2     Types: Cigarettes   • Smokeless tobacco: Never   Vaping Use   • Vaping Use: Never used   Substance Use Topics   • Alcohol use: Not Currently     Comment: FORMER-QUIT 1997   • Drug use: Never         Review of Systems:  Review of Systems     Physical Exam:  /64 (BP Location: Left arm, Patient Position: Lying)   Pulse 95   Temp (!) 101 °F (38.3 °C) (Oral)   Resp 20    "Ht 172.7 cm (67.99\")   Wt 87.5 kg (192 lb 14.4 oz)   SpO2 92%   BMI 29.34 kg/m²     Physical Exam   ***          Procedures:  Procedures      Medical Decision Making:      Comorbidities that affect care:    {Comorbidities that affect care:13416}    External Notes reviewed:    {External Note review (Optional):74620}      The following orders were placed and all results were independently analyzed by me:  Orders Placed This Encounter   Procedures   • Blood Culture - Blood,   • Blood Culture - Blood,   • XR Chest 1 View   • Blanchard Draw   • Comprehensive Metabolic Panel   • BNP   • Single High Sensitivity Troponin T   • Lactic Acid, Plasma   • CBC Auto Differential   • NPO Diet NPO Type: Strict NPO   • Undress & Gown   • Vital Signs   • Undress & Gown   • Continuous Pulse Oximetry   • Vital Signs   • Oxygen Therapy- Nasal Cannula; Titrate 1-6 LPM Per SpO2; 90 - 95%   • Oxygen Therapy- Nasal Cannula; Titrate 1-6 LPM Per SpO2; 90 - 95%   • ECG 12 Lead ED Triage Standing Order; SOA   • Insert Peripheral IV   • Insert Peripheral IV   • CBC & Differential   • Green Top (Gel)   • Lavender Top   • Gold Top - SST   • Light Blue Top       Medications Given in the Emergency Department:  Medications   sodium chloride 0.9 % flush 10 mL (has no administration in time range)   sodium chloride 0.9 % flush 10 mL (has no administration in time range)        ED Course:         Labs:    Lab Results (last 24 hours)       Procedure Component Value Units Date/Time    POCT SARS-CoV-2 + Flu Antigen RACHEL [[id number]]  (Abnormal) Collected: 01/25/24 1048    Specimen: Swab Updated: 01/25/24 1049     SARS Antigen Detected     Influenza A Antigen RACHEL Not Detected     Influenza B Antigen RACHEL Not Detected     Internal Control Passed     Lot Number [id number]     Expiration Date 11/27/24    CBC & Differential [[id number]]  (Abnormal) Collected: 01/25/24 1212    Specimen: Blood from Arm, Right Updated: 01/25/24 1226    Narrative:      The following " orders were created for panel order CBC & Differential.  Procedure                               Abnormality         Status                     ---------                               -----------         ------                     CBC Auto Differential[[id number]]        Abnormal            Final result                 Please view results for these tests on the individual orders.    Comprehensive Metabolic Panel [[id number]] Collected: 01/25/24 1212    Specimen: Blood from Arm, Right Updated: 01/25/24 1221    BNP [[id number]] Collected: 01/25/24 1212    Specimen: Blood from Arm, Right Updated: 01/25/24 1221    Single High Sensitivity Troponin T [[id number]] Collected: 01/25/24 1212    Specimen: Blood from Arm, Right Updated: 01/25/24 1221    Lactic Acid, Plasma [[id number]]  (Normal) Collected: 01/25/24 1212    Specimen: Blood from Arm, Right Updated: 01/25/24 1241     Lactate 1.1 mmol/L     Blood Culture - Blood, Arm, Left [[id number]] Collected: 01/25/24 1212    Specimen: Blood from Arm, Left Updated: 01/25/24 1222    Blood Culture - Blood, Arm, Right [[id number]] Collected: 01/25/24 1212    Specimen: Blood from Arm, Right Updated: 01/25/24 1222    CBC Auto Differential [[id number]]  (Abnormal) Collected: 01/25/24 1212    Specimen: Blood from Arm, Right Updated: 01/25/24 1226     WBC 10.21 10*3/mm3      RBC 4.03 10*6/mm3      Hemoglobin 11.9 g/dL      Hematocrit 37.6 %      MCV 93.3 fL      MCH 29.5 pg      MCHC 31.6 g/dL      RDW 12.5 %      RDW-SD 42.9 fl      MPV 9.7 fL      Platelets 287 10*3/mm3      Neutrophil % 77.5 %      Lymphocyte % 8.5 %      Monocyte % 13.0 %      Eosinophil % 0.1 %      Basophil % 0.4 %      Immature Grans % 0.5 %      Neutrophils, Absolute 7.91 10*3/mm3      Lymphocytes, Absolute 0.87 10*3/mm3      Monocytes, Absolute 1.33 10*3/mm3      Eosinophils, Absolute 0.01 10*3/mm3      Basophils, Absolute 0.04 10*3/mm3      Immature Grans, Absolute 0.05 10*3/mm3      nRBC 0.0 /100 WBC               Imaging:    No Radiology Exams Resulted Within Past 24 Hours      Differential Diagnosis and Discussion:    {Differentials:59669}    {Independent Review of (Optional):40648}    MDM     {Critical Care:29278}    {SEPSIS RECOGNITION:72398}    Patient Care Considerations:    {Considerations (Optional):18777}      Consultants/Shared Management Plan:    {Shared Management Plan (Optional):49427}    Social Determinants of Health:    {Social Determinants of Health (Optional):77251}      Disposition and Care Coordination:    {Admission consideration:52604}    {Discharge (Optional):71985}    Final diagnoses:   None        ED Disposition       None            This medical record created using voice recognition software.           following medications and follow up:      Medication List        New Prescriptions      dexAMETHasone 4 MG tablet  Commonly known as: DECADRON  Take 1.5 tablets by mouth 3 (Three) Times a Day.            Changed      ALPRAZolam 1 MG tablet  Commonly known as: XANAX  TAKE ONE TABLET BY MOUTH THREE TIMES PER DAY AS NEEDED FOR ANXIETY  What changed: See the new instructions.     Aspirin Low Dose 81 MG EC tablet  Generic drug: aspirin  TAKE ONE TABLET BY MOUTH EVERY DAY  What changed: how much to take     carvedilol 12.5 MG tablet  Commonly known as: COREG  TAKE ONE TABLET BY MOUTH TWO TIMES PER DAY  What changed: See the new instructions.     cyanocobalamin 1000 MCG/ML injection  INJECT 1 ML INTO THE APPROPRIATE MUSCLE AS DIRECTED BY PRESCRIBER EVERY 30 DAYS.  What changed: See the new instructions.     FeroSul 325 (65 Fe) MG tablet  Generic drug: ferrous sulfate  TAKE ONE TABLET BY MOUTH EVERY DAY  What changed:   how much to take  when to take this     gabapentin 300 MG capsule  Commonly known as: NEURONTIN  TAKE ONE CAPSULE BY MOUTH THREE TIMES PER DAY  What changed: See the new instructions.     loratadine 10 MG tablet  Commonly known as: Claritin  Take 1 tablet by mouth Daily.  What changed:   when to take this  reasons to take this     nitroglycerin 0.4 MG SL tablet  Commonly known as: NITROSTAT  PLACE 1 TAB UNDER TONGUE FOR CHEST PAIN, MAY REPEAT EVERY 5 MINUTES UP TO 3 TABS THEN CALL 911/GO E.R.  What changed: See the new instructions.     ondansetron 4 MG tablet  Commonly known as: ZOFRAN  TAKE ONE TABLET BY MOUTH EVERY 8 HOURS  What changed:   when to take this  reasons to take this               Where to Get Your Medications        These medications were sent to [place], [place] - [address] [place] [address] - [phone]  - [phone] [address] [address]      Phone: [phone]   dexAMETHasone 4 MG tablet      Tim Elaine, APRN  [address]  [place] [address]  [phone]    Schedule an appointment as soon as possible for a visit         Final diagnoses:   COVID   COPD exacerbation        ED Disposition       ED Disposition   Discharge    Condition   Stable    Comment   --               This medical record created using voice recognition software.             Art Vasques DO  01/30/24 1052

## 2024-01-25 NOTE — SIGNIFICANT NOTE
01/25/24 7700   Plan   Plan Comments SW was consulted to assist wiht setting up home oxygen. Pt states that he would prefer to use the same company as his wife which is [employer]. [employer] contacted them and faxed over the information needed to get his set up started. [employer] provided pt a tank to go home with.

## 2024-01-30 LAB
BACTERIA SPEC AEROBE CULT: NORMAL
BACTERIA SPEC AEROBE CULT: NORMAL
QT INTERVAL: 390 MS
QTC INTERVAL: 476 MS

## 2024-01-30 NOTE — TELEPHONE ENCOUNTER
Caller: AB GROSSMAN    Relationship: Emergency Contact    Best call back number: [phone]    What medication are you requesting: ZOFRAN    What are your current symptoms: NAUSEA, UPSET STOMACH    How long have you been experiencing symptoms: ONE WEEK    Have you had these symptoms before:    [x] Yes  [] No    Have you been treated for these symptoms before:   [x] Yes  [] No    If a prescription is needed, what is your preferred pharmacy and phone number: [address] [phone] [facility] [phone] [facility]     Additional notes: